# Patient Record
Sex: FEMALE | Race: WHITE | NOT HISPANIC OR LATINO | Employment: OTHER | URBAN - METROPOLITAN AREA
[De-identification: names, ages, dates, MRNs, and addresses within clinical notes are randomized per-mention and may not be internally consistent; named-entity substitution may affect disease eponyms.]

---

## 2018-04-19 ENCOUNTER — APPOINTMENT (OUTPATIENT)
Dept: LAB | Facility: HOSPITAL | Age: 63
End: 2018-04-19
Attending: ORTHOPAEDIC SURGERY
Payer: COMMERCIAL

## 2018-04-19 ENCOUNTER — TRANSCRIBE ORDERS (OUTPATIENT)
Dept: PREADMISSION TESTING | Facility: HOSPITAL | Age: 63
End: 2018-04-19

## 2018-04-19 ENCOUNTER — OFFICE VISIT (OUTPATIENT)
Dept: PREADMISSION TESTING | Facility: HOSPITAL | Age: 63
End: 2018-04-19
Attending: ORTHOPAEDIC SURGERY
Payer: COMMERCIAL

## 2018-04-19 VITALS
HEIGHT: 66 IN | DIASTOLIC BLOOD PRESSURE: 76 MMHG | TEMPERATURE: 98.1 F | RESPIRATION RATE: 16 BRPM | WEIGHT: 233.1 LBS | BODY MASS INDEX: 37.46 KG/M2 | SYSTOLIC BLOOD PRESSURE: 162 MMHG | HEART RATE: 69 BPM

## 2018-04-19 DIAGNOSIS — M16.12 PRIMARY OSTEOARTHRITIS OF LEFT HIP: Primary | ICD-10-CM

## 2018-04-19 DIAGNOSIS — I25.10 CORONARY ARTERY DISEASE INVOLVING NATIVE HEART WITHOUT ANGINA PECTORIS, UNSPECIFIED VESSEL OR LESION TYPE: ICD-10-CM

## 2018-04-19 DIAGNOSIS — M16.12 PRIMARY OSTEOARTHRITIS OF LEFT HIP: ICD-10-CM

## 2018-04-19 DIAGNOSIS — I10 ESSENTIAL HYPERTENSION: ICD-10-CM

## 2018-04-19 DIAGNOSIS — Z01.818 PREOPERATIVE EXAMINATION: ICD-10-CM

## 2018-04-19 DIAGNOSIS — E78.5 DYSLIPIDEMIA: ICD-10-CM

## 2018-04-19 DIAGNOSIS — G47.33 OSA (OBSTRUCTIVE SLEEP APNEA): ICD-10-CM

## 2018-04-19 PROBLEM — I31.9 PERICARDITIS: Status: ACTIVE | Noted: 2018-04-19

## 2018-04-19 LAB
ANION GAP SERPL CALC-SCNC: 10 MEQ/L (ref 3–15)
BUN SERPL-MCNC: 17 MG/DL (ref 8–20)
CALCIUM SERPL-MCNC: 9.8 MG/DL (ref 8.9–10.3)
CHLORIDE SERPL-SCNC: 100 MMOL/L (ref 98–109)
CO2 SERPL-SCNC: 27 MMOL/L (ref 22–32)
CREAT SERPL-MCNC: 0.7 MG/DL (ref 0.6–1.1)
ERYTHROCYTE [DISTWIDTH] IN BLOOD BY AUTOMATED COUNT: 13.3 % (ref 11.7–14.4)
ERYTHROCYTE [SEDIMENTATION RATE] IN BLOOD BY WESTERGREN METHOD: 33 MM/HR
GFR SERPL CREATININE-BSD FRML MDRD: >60 ML/MIN/1.73M*2
GLUCOSE SERPL-MCNC: 89 MG/DL (ref 70–99)
HCT VFR BLDCO AUTO: 35.3 % (ref 35–45)
HGB BLD-MCNC: 12 G/DL (ref 11.8–15.7)
MCH RBC QN AUTO: 29.4 PG (ref 28–33.2)
MCHC RBC AUTO-ENTMCNC: 34 G/DL (ref 32.2–35.5)
MCV RBC AUTO: 86.5 FL (ref 83–98)
PDW BLD AUTO: 10.5 FL (ref 9.4–12.3)
PLATELET # BLD AUTO: 227 K/UL (ref 150–369)
POTASSIUM SERPL-SCNC: 3.6 MMOL/L (ref 3.6–5.1)
RBC # BLD AUTO: 4.08 M/UL (ref 3.93–5.22)
SODIUM SERPL-SCNC: 137 MMOL/L (ref 136–144)
WBC # BLD AUTO: 8.26 K/UL (ref 3.8–10.5)

## 2018-04-19 PROCEDURE — 36415 COLL VENOUS BLD VENIPUNCTURE: CPT | Performed by: HOSPITALIST

## 2018-04-19 PROCEDURE — 80048 BASIC METABOLIC PNL TOTAL CA: CPT | Performed by: HOSPITALIST

## 2018-04-19 PROCEDURE — 87081 CULTURE SCREEN ONLY: CPT | Performed by: HOSPITALIST

## 2018-04-19 PROCEDURE — 99204 OFFICE O/P NEW MOD 45 MIN: CPT | Performed by: HOSPITALIST

## 2018-04-19 PROCEDURE — 85027 COMPLETE CBC AUTOMATED: CPT | Performed by: HOSPITALIST

## 2018-04-19 PROCEDURE — 85652 RBC SED RATE AUTOMATED: CPT | Performed by: HOSPITALIST

## 2018-04-19 RX ORDER — AMLODIPINE BESYLATE 5 MG/1
5 TABLET ORAL EVERY MORNING
COMMUNITY
Start: 2013-10-21 | End: 2019-09-10 | Stop reason: ALTCHOICE

## 2018-04-19 RX ORDER — ASPIRIN 81 MG/1
81 TABLET ORAL
Status: ON HOLD | COMMUNITY
Start: 2013-10-21 | End: 2018-05-02

## 2018-04-19 RX ORDER — SERTRALINE HYDROCHLORIDE 50 MG/1
50 TABLET, FILM COATED ORAL EVERY MORNING
COMMUNITY
Start: 2011-08-10 | End: 2025-02-11 | Stop reason: SDUPTHER

## 2018-04-19 RX ORDER — CLONIDINE 0.1 MG/24H
PATCH, EXTENDED RELEASE TRANSDERMAL WEEKLY
COMMUNITY
Start: 2011-08-10 | End: 2019-09-10 | Stop reason: ALTCHOICE

## 2018-04-19 RX ORDER — FERROUS SULFATE 325(65) MG
65 TABLET ORAL
COMMUNITY
End: 2018-05-02 | Stop reason: HOSPADM

## 2018-04-19 RX ORDER — LOSARTAN POTASSIUM 50 MG/1
50 TABLET ORAL NIGHTLY
COMMUNITY
Start: 2013-10-21

## 2018-04-19 RX ORDER — ATORVASTATIN CALCIUM 40 MG/1
40 TABLET, FILM COATED ORAL EVERY EVENING
COMMUNITY

## 2018-04-19 RX ORDER — CHLORTHALIDONE 25 MG/1
25 TABLET ORAL EVERY MORNING
COMMUNITY
Start: 2013-10-21 | End: 2019-09-10 | Stop reason: ALTCHOICE

## 2018-04-19 RX ORDER — ALBUTEROL SULFATE 0.83 MG/ML
2.5 SOLUTION RESPIRATORY (INHALATION) EVERY 6 HOURS PRN
COMMUNITY
End: 2019-09-10 | Stop reason: ALTCHOICE

## 2018-04-19 ASSESSMENT — PAIN SCALES - GENERAL: PAINLEVEL: 0-NO PAIN

## 2018-04-19 NOTE — CONSULTS
[facility] Medicine Service -  Pre-Operative Consultation       Patient Name: Barbara Landeros  Referring Surgeon: Dr Mckee     Reason for Referral: Pre-Operative Evaluation  Surgical Procedure: L CON  Operative Date: 18  Other Providers:      PCP: Tenzin Luevano SR, DO        HISTORY OF PRESENT ILLNESS      Barbara Landeros is a 62 y.o. female presenting today to the [facility] at [facility] [place] [facility] for pre-operative evaluation prior to planned surgery.    History of asthma - wheezing only with upper respiratory infection in the past. Had URI in Feb treated with short course of antibiotics and inhalers - all symptoms resolved and feels well at present.     The patient denies any current or recent chest pain or pressure, dyspnea, cough, sputum, fevers, chills, abdominal pain, nausea, vomiting, diarrhea or other symptoms.     Functionally, the patient is able to ascend a flight or so of stairs with no dyspnea or chest pain.     The patient denies, on specific questioning, the following:  No history of MI, arrhythmia,or CHF.  No history of CHUY + snoring, denies gasping/apnea, cardiologist discussed sleep study but she hasnt set it up yet  No history of DVT/PE.  No history of COPD.  No history of CVA.  No history of DM.   No history of CKD.     PAST MEDICAL AND SURGICAL HISTORY      Past Medical History:   Diagnosis Date   • Anxiety    • Arthritis    • Asthma    • H. pylori infection     treated with prev pac    • Heart palpitations    • Hypertension    • Lipid disorder    • Pericarditis     hospitalized 3-4 days, followed by Dr Mathur       Past Surgical History:   Procedure Laterality Date   •  SECTION      ,    • COLONOSCOPY     • ESOPHAGOGASTRODUODENOSCOPY     • JOINT REPLACEMENT Right     hip replacement   • LUNG SURGERY Left     pleural fluid   • SKIN BIOPSY      nose, BCC   • TONSILLECTOMY     • WISDOM TOOTH EXTRACTION         MEDICATIONS  "       Current Outpatient Prescriptions:   •  albuterol 2.5 mg /3 mL (0.083 %) nebulizer solution, Take 2.5 mg by nebulization every 6 (six) hours as needed for wheezing., Disp: , Rfl:   •  amLODIPine (NORVASC) 5 mg tablet, 5 mg every morning., Disp: , Rfl:   •  aspirin (ASPIR-81) 81 mg enteric coated tablet, 81 mg., Disp: , Rfl:   •  atorvastatin (LIPITOR) 20 mg tablet, Take 20 mg by mouth every evening., Disp: , Rfl:   •  chlorthalidone (HYGROTEN) 25 mg tablet, 25 mg every morning., Disp: , Rfl:   •  cloNIDine (CATAPRES-TTS) 0.1 mg/24 hr, once a week. mondays, Disp: , Rfl:   •  ferrous sulfate 325 mg (65 mg iron) tablet, Take 65 mg by mouth daily with breakfast., Disp: , Rfl:   •  losartan (COZAAR) 50 mg tablet, 50 mg every morning., Disp: , Rfl:   •  multivitamin tablet, Take by mouth daily., Disp: , Rfl:   •  sertraline (ZOLOFT) 50 mg tablet, 50 mg every morning., Disp: , Rfl:     ALLERGIES      Prevpac [[id number]]    FAMILY HISTORY      family history includes Heart disease in her father and mother; Hyperlipidemia in her father; Hypertension in her mother and sister.    Denies any prior known family history of DVTs/PEs/clotting disorder    SOCIAL HISTORY      Social History   Substance Use Topics   • Smoking status: Former Smoker     Quit date: 1997   • Smokeless tobacco: Never Used   • Alcohol use Yes      Comment: occas       REVIEW OF SYSTEMS      All other systems reviewed and negative except as noted in HPI    PHYSICAL EXAMINATION      BP (!) 162/76 (BP Location: Right upper arm, Patient Position: Sitting)   Pulse 69   Temp 36.7 °C (98.1 °F) (Temporal)   Resp 16   Ht 1.676 m (5' 6\")   Wt 106 kg (233 lb 1.6 oz)   BMI 37.62 kg/m²   Body mass index is 37.62 kg/m².    Physical Exam   Constitutional: She is oriented to person, place, and time. She appears well-developed and well-nourished.   HENT:   Head: Normocephalic and atraumatic.   Mouth/Throat: Oropharynx is clear and moist. "   Eyes: Conjunctivae and EOM are normal.   Neck: Normal range of motion. Neck supple.   Cardiovascular: Normal rate, regular rhythm and normal heart sounds.    Pulmonary/Chest: Effort normal and breath sounds normal.   Abdominal: Soft. Bowel sounds are normal.   Neurological: She is alert and oriented to person, place, and time.   Skin: Skin is warm and dry.   Psychiatric: She has a normal mood and affect.       LABS / EKG        Labs  Lab Results   Component Value Date    WBC 8.26 04/19/2018    HGB 12.0 04/19/2018    HCT 35.3 04/19/2018    MCV 86.5 04/19/2018     04/19/2018     Lab Results   Component Value Date    GLUCOSE 89 04/19/2018    CALCIUM 9.8 04/19/2018     04/19/2018    K 3.6 04/19/2018    CO2 27 04/19/2018     04/19/2018    BUN 17 04/19/2018    CREATININE 0.7 04/19/2018     No results found for: HGBA1C      ECG/Telemetry  EKG normal sinus rhythm     ASSESSMENT AND PLAN         Essential hypertension  Continue norvasc, clonidine   Hold chlorthalidone, losartan morning of surgery    Dyslipidemia  Continue statin     CAD (coronary artery disease)  No obstructive CAD on CTA in the past  Echo done last week with normal LV function per cardiology   No further preoperative testing recommended    Pericarditis  Echo done last week with cardiology - no effusion noted    CHUY (obstructive sleep apnea)  Increased risk of pulmonary complications if she does have untreated CHUY, can be further optimized with CPAP  Plans to pursue further sleep evaluation - defer further recommendations to pulmonary   Risk can be further minimized by extubation to BIPAP post operatively  If she is prescribed CPAP - will bring own machine to hospital   Monitor on CHUY protocol post op, minimize use of narcotics/sedating medications      Asthma - mild, well controlled     In regards to perioperative cardiac risk:  The patient denies any history of ischemic heart disease, denies any history of CHF, denies any history of  CVA, is not on pre-operative treatment with insulin, and does not have a pre-operative creatinine > 2 mg/dL.   The Revised Cardiac Risk Index (RCRI) for this patient indicates 0.4% risk.     Seen by cardiology, no further preop testing recommended    Further comments:  Resume supplements when OK with surgical team.  I would encourage incentive spirometry to assist with minimizing peace-operative pulmonary risk.  DVT prophylaxis and timing of such per the discretion of the surgeon.     Please do not hesitate to contact [facility] during the upcoming hospitalization with any questions or concerns.     Estela Raman MD  4/19/2018

## 2018-04-19 NOTE — ASSESSMENT & PLAN NOTE
No obstructive CAD on CTA in the past  Echo done last week with normal LV function per cardiology   No further preoperative testing recommended

## 2018-04-19 NOTE — PRE-PROCEDURE INSTRUCTIONS
1. We will call you between 3 pm and 7 pm on April 30, 2018 to determine that arrival time for your procedure. If you do not hear by 6PM. Please call [phone] for arrival time.    2. Please report to [facility] in [place] / jennie, walk into [facility] and report to the admission desk on first floor on the day of your procedure.   3. Please follow the following fasting guidelines:   Nothing to eat or drink after midnight unless otherwise instructed by  your physician.    4. Early on the morning of the procedure please take your usual dose of the listed medications with a sip of water:    Amlodipine  Chlorthalidone  Clonidine  zoloft     5. Other Instructions: bring list of medications, surgical shower, follow all instructions per md   6. If you develop a cold, cough, fever, rash, or other symptom prior to the data of the procedure, please report it to your physician immediately.   7. If you need to cancel the procedure for any reason, please contact your physician or call the unit listed above.   8. Make arrangements to have someone drive you home from the procedure. If you have not arranged for transportation home, your surgery may be cancelled.    9. You may not take public transportation unless accompanied by a responsible person.   10. You may not drive a car or operate complex or potentially dangerous machinery for 24 hours following anesthesia and/or sedation.   11. If it is medically necessary for you to have a longer stay, you will be informed as soon as the decision is made.   12. Do not wear or being anything of value to the hospital including jewelry of any kind. Do not wear make-up or contact lenses. DO bring your glasses and hearing aid.   13. No lotion, creams, powders, or oils on skin the morning of procedure    14. Dress in comfortable clothes.   15.  If instructed, please bring a copy of your Advanced Directive (Living Will/Durable Power of ) on the day of your procedure.      Pre operative  instructions given as per protocol.  Form explained by: Lesvia Galloway RN     I have read and understand the above information. I have had sufficient opportunity to ask questions I might have and they have been answered to my satisfaction. I agree to comply with the Patient Responsibilities listed above and have received a copy of this form.

## 2018-04-19 NOTE — ASSESSMENT & PLAN NOTE
Increased risk of pulmonary complications if she does have untreated CHUY, can be further optimized with CPAP  Plans to pursue further sleep evaluation - defer further recommendations to pulmonary   Risk can be further minimized by extubation to BIPAP post operatively  If she is prescribed CPAP - will bring own machine to hospital   Monitor on CHUY protocol post op, minimize use of narcotics/sedating medications

## 2018-04-21 LAB — MICROORGANISM SPEC CULT: NORMAL

## 2018-04-30 ENCOUNTER — ANESTHESIA EVENT (OUTPATIENT)
Dept: OPERATING ROOM | Facility: HOSPITAL | Age: 63
Setting detail: SURGERY ADMIT
DRG: 470 | End: 2018-04-30
Payer: COMMERCIAL

## 2018-05-01 ENCOUNTER — APPOINTMENT (INPATIENT)
Dept: PHYSICAL THERAPY | Facility: HOSPITAL | Age: 63
DRG: 470 | End: 2018-05-01
Attending: PHYSICIAN ASSISTANT
Payer: COMMERCIAL

## 2018-05-01 ENCOUNTER — ANESTHESIA (OUTPATIENT)
Dept: OPERATING ROOM | Facility: HOSPITAL | Age: 63
Setting detail: SURGERY ADMIT
DRG: 470 | End: 2018-05-01
Payer: COMMERCIAL

## 2018-05-01 ENCOUNTER — APPOINTMENT (INPATIENT)
Dept: RADIOLOGY | Facility: HOSPITAL | Age: 63
DRG: 470 | End: 2018-05-01
Attending: PHYSICIAN ASSISTANT
Payer: COMMERCIAL

## 2018-05-01 ENCOUNTER — HOSPITAL ENCOUNTER (INPATIENT)
Facility: HOSPITAL | Age: 63
LOS: 1 days | Discharge: HOME | DRG: 470 | End: 2018-05-02
Attending: ORTHOPAEDIC SURGERY | Admitting: ORTHOPAEDIC SURGERY
Payer: COMMERCIAL

## 2018-05-01 DIAGNOSIS — M16.12 PRIMARY OSTEOARTHRITIS OF LEFT HIP: ICD-10-CM

## 2018-05-01 PROCEDURE — C1776 JOINT DEVICE (IMPLANTABLE): HCPCS | Performed by: ORTHOPAEDIC SURGERY

## 2018-05-01 PROCEDURE — 63600000 HC DRUGS/DETAIL CODE: Performed by: PHYSICIAN ASSISTANT

## 2018-05-01 PROCEDURE — 3E0R3BZ INTRODUCTION OF ANESTHETIC AGENT INTO SPINAL CANAL, PERCUTANEOUS APPROACH: ICD-10-PCS | Performed by: ANESTHESIOLOGY

## 2018-05-01 PROCEDURE — 25800000 HC PHARMACY IV SOLUTIONS: Performed by: PHYSICIAN ASSISTANT

## 2018-05-01 PROCEDURE — 37000010 HC ANESTHESIA SPINAL: Performed by: ORTHOPAEDIC SURGERY

## 2018-05-01 PROCEDURE — 25000000 HC PHARMACY GENERAL: Performed by: ORTHOPAEDIC SURGERY

## 2018-05-01 PROCEDURE — 63600000 HC DRUGS/DETAIL CODE: Performed by: NURSE ANESTHETIST, CERTIFIED REGISTERED

## 2018-05-01 PROCEDURE — 63700000 HC SELF-ADMINISTRABLE DRUG: Performed by: PHYSICIAN ASSISTANT

## 2018-05-01 PROCEDURE — 36000005 HC OR LEVEL 5 INITIAL 30MIN: Performed by: ORTHOPAEDIC SURGERY

## 2018-05-01 PROCEDURE — 25000000 HC PHARMACY GENERAL: Performed by: PHYSICIAN ASSISTANT

## 2018-05-01 PROCEDURE — 25800000 HC PHARMACY IV SOLUTIONS: Performed by: ORTHOPAEDIC SURGERY

## 2018-05-01 PROCEDURE — 12000000 HC ROOM AND CARE MED/SURG

## 2018-05-01 PROCEDURE — 71000011 HC PACU PHASE 1 EA ADDL MIN: Performed by: ORTHOPAEDIC SURGERY

## 2018-05-01 PROCEDURE — 73501 X-RAY EXAM HIP UNI 1 VIEW: CPT | Mod: LT

## 2018-05-01 PROCEDURE — 97161 PT EVAL LOW COMPLEX 20 MIN: CPT | Mod: GP

## 2018-05-01 PROCEDURE — 37000010 ANESTHESIA SPINAL BLOCK: Performed by: ANESTHESIOLOGY

## 2018-05-01 PROCEDURE — 25000000 HC PHARMACY GENERAL: Performed by: NURSE ANESTHETIST, CERTIFIED REGISTERED

## 2018-05-01 PROCEDURE — 0SRB04A REPLACEMENT OF LEFT HIP JOINT WITH CERAMIC ON POLYETHYLENE SYNTHETIC SUBSTITUTE, UNCEMENTED, OPEN APPROACH: ICD-10-PCS | Performed by: ORTHOPAEDIC SURGERY

## 2018-05-01 PROCEDURE — 71000001 HC PACU PHASE 1 INITIAL 30MIN: Performed by: ORTHOPAEDIC SURGERY

## 2018-05-01 PROCEDURE — 36000015 HC OR LEVEL 5 EA ADDL MIN: Performed by: ORTHOPAEDIC SURGERY

## 2018-05-01 PROCEDURE — 27200000 HC STERILE SUPPLY: Performed by: ORTHOPAEDIC SURGERY

## 2018-05-01 DEVICE — HEAD CERAMIC FEMORAL: Type: IMPLANTABLE DEVICE | Site: HIP | Status: FUNCTIONAL

## 2018-05-01 DEVICE — IMPLANTABLE DEVICE: Type: IMPLANTABLE DEVICE | Site: HIP | Status: FUNCTIONAL

## 2018-05-01 DEVICE — BONE SCREW 6.5X30 SELF-TAPPING: Type: IMPLANTABLE DEVICE | Site: HIP | Status: FUNCTIONAL

## 2018-05-01 DEVICE — STEM FEM TRAB METAL SIZE 12 128MM: Type: IMPLANTABLE DEVICE | Site: HIP | Status: FUNCTIONAL

## 2018-05-01 RX ORDER — SODIUM CHLORIDE 9 MG/ML
INJECTION, SOLUTION INTRAVENOUS CONTINUOUS
Status: DISCONTINUED | OUTPATIENT
Start: 2018-05-01 | End: 2018-05-02 | Stop reason: HOSPADM

## 2018-05-01 RX ORDER — POTASSIUM CHLORIDE 750 MG/1
20 TABLET, FILM COATED, EXTENDED RELEASE ORAL DAILY
Status: DISCONTINUED | OUTPATIENT
Start: 2018-05-01 | End: 2018-05-02 | Stop reason: HOSPADM

## 2018-05-01 RX ORDER — LOSARTAN POTASSIUM 50 MG/1
50 TABLET ORAL EVERY MORNING
Status: DISCONTINUED | OUTPATIENT
Start: 2018-05-02 | End: 2018-05-02 | Stop reason: HOSPADM

## 2018-05-01 RX ORDER — ACETAMINOPHEN 325 MG/1
975 TABLET ORAL ONCE
Status: COMPLETED | OUTPATIENT
Start: 2018-05-01 | End: 2018-05-01

## 2018-05-01 RX ORDER — CEFAZOLIN SODIUM/WATER 1 G/10 ML
2 SYRINGE (ML) INTRAVENOUS
Status: COMPLETED | OUTPATIENT
Start: 2018-05-01 | End: 2018-05-02

## 2018-05-01 RX ORDER — KETOROLAC TROMETHAMINE 30 MG/ML
7.5 INJECTION, SOLUTION INTRAMUSCULAR; INTRAVENOUS EVERY 6 HOURS
Status: DISCONTINUED | OUTPATIENT
Start: 2018-05-01 | End: 2018-05-02 | Stop reason: HOSPADM

## 2018-05-01 RX ORDER — BUPIVACAINE HCL/EPINEPHRINE 0.5-1:200K
VIAL (ML) INJECTION AS NEEDED
Status: DISCONTINUED | OUTPATIENT
Start: 2018-05-01 | End: 2018-05-01 | Stop reason: HOSPADM

## 2018-05-01 RX ORDER — SENNOSIDES 8.6 MG/1
1 TABLET ORAL 2 TIMES DAILY PRN
Status: DISCONTINUED | OUTPATIENT
Start: 2018-05-01 | End: 2018-05-02 | Stop reason: HOSPADM

## 2018-05-01 RX ORDER — DIPHENHYDRAMINE HYDROCHLORIDE 50 MG/ML
25 INJECTION INTRAMUSCULAR; INTRAVENOUS EVERY 6 HOURS PRN
Status: DISCONTINUED | OUTPATIENT
Start: 2018-05-01 | End: 2018-05-02 | Stop reason: HOSPADM

## 2018-05-01 RX ORDER — PHENYLEPHRINE HYDROCHLORIDE 10 MG/ML
INJECTION INTRAVENOUS AS NEEDED
Status: DISCONTINUED | OUTPATIENT
Start: 2018-05-01 | End: 2018-05-01 | Stop reason: SURG

## 2018-05-01 RX ORDER — TRAMADOL HYDROCHLORIDE 50 MG/1
50-100 TABLET ORAL EVERY 6 HOURS PRN
Status: DISCONTINUED | OUTPATIENT
Start: 2018-05-01 | End: 2018-05-02 | Stop reason: HOSPADM

## 2018-05-01 RX ORDER — ONDANSETRON HYDROCHLORIDE 2 MG/ML
4 INJECTION, SOLUTION INTRAVENOUS EVERY 8 HOURS PRN
Status: DISCONTINUED | OUTPATIENT
Start: 2018-05-01 | End: 2018-05-02 | Stop reason: HOSPADM

## 2018-05-01 RX ORDER — CEFAZOLIN SODIUM/WATER 1 G/10 ML
2 SYRINGE (ML) INTRAVENOUS
Status: COMPLETED | OUTPATIENT
Start: 2018-05-01 | End: 2018-05-01

## 2018-05-01 RX ORDER — HYDROMORPHONE HYDROCHLORIDE 2 MG/ML
.5-1 INJECTION, SOLUTION INTRAMUSCULAR; INTRAVENOUS; SUBCUTANEOUS
Status: DISCONTINUED | OUTPATIENT
Start: 2018-05-01 | End: 2018-05-02 | Stop reason: HOSPADM

## 2018-05-01 RX ORDER — AMLODIPINE BESYLATE 5 MG/1
5 TABLET ORAL EVERY MORNING
Status: DISCONTINUED | OUTPATIENT
Start: 2018-05-02 | End: 2018-05-02 | Stop reason: HOSPADM

## 2018-05-01 RX ORDER — FAMOTIDINE 10 MG/ML
INJECTION INTRAVENOUS AS NEEDED
Status: DISCONTINUED | OUTPATIENT
Start: 2018-05-01 | End: 2018-05-01 | Stop reason: SURG

## 2018-05-01 RX ORDER — CHLORTHALIDONE 25 MG/1
25 TABLET ORAL EVERY MORNING
Status: DISCONTINUED | OUTPATIENT
Start: 2018-05-02 | End: 2018-05-02 | Stop reason: HOSPADM

## 2018-05-01 RX ORDER — POLYETHYLENE GLYCOL 3350 17 G/17G
17 POWDER, FOR SOLUTION ORAL DAILY
Status: DISCONTINUED | OUTPATIENT
Start: 2018-05-01 | End: 2018-05-02 | Stop reason: HOSPADM

## 2018-05-01 RX ORDER — AMOXICILLIN 250 MG
1 CAPSULE ORAL 2 TIMES DAILY
Status: DISCONTINUED | OUTPATIENT
Start: 2018-05-01 | End: 2018-05-02 | Stop reason: HOSPADM

## 2018-05-01 RX ORDER — PROPOFOL 10 MG/ML
INJECTION, EMULSION INTRAVENOUS AS NEEDED
Status: DISCONTINUED | OUTPATIENT
Start: 2018-05-01 | End: 2018-05-01 | Stop reason: SURG

## 2018-05-01 RX ORDER — POLYETHYLENE GLYCOL 3350 17 G/17G
17 POWDER, FOR SOLUTION ORAL DAILY PRN
Status: DISCONTINUED | OUTPATIENT
Start: 2018-05-01 | End: 2018-05-02 | Stop reason: HOSPADM

## 2018-05-01 RX ORDER — ACETAMINOPHEN 325 MG/1
650 TABLET ORAL
Status: DISCONTINUED | OUTPATIENT
Start: 2018-05-01 | End: 2018-05-02 | Stop reason: HOSPADM

## 2018-05-01 RX ORDER — ONDANSETRON HYDROCHLORIDE 2 MG/ML
INJECTION, SOLUTION INTRAVENOUS AS NEEDED
Status: DISCONTINUED | OUTPATIENT
Start: 2018-05-01 | End: 2018-05-01 | Stop reason: SURG

## 2018-05-01 RX ORDER — ONDANSETRON 4 MG/1
4 TABLET, ORALLY DISINTEGRATING ORAL EVERY 8 HOURS PRN
Status: DISCONTINUED | OUTPATIENT
Start: 2018-05-01 | End: 2018-05-02 | Stop reason: HOSPADM

## 2018-05-01 RX ORDER — ALBUTEROL SULFATE 0.83 MG/ML
2.5 SOLUTION RESPIRATORY (INHALATION) EVERY 6 HOURS PRN
Status: DISCONTINUED | OUTPATIENT
Start: 2018-05-01 | End: 2018-05-02 | Stop reason: HOSPADM

## 2018-05-01 RX ORDER — METOCLOPRAMIDE HYDROCHLORIDE 5 MG/ML
INJECTION INTRAMUSCULAR; INTRAVENOUS AS NEEDED
Status: DISCONTINUED | OUTPATIENT
Start: 2018-05-01 | End: 2018-05-01 | Stop reason: SURG

## 2018-05-01 RX ORDER — DEXAMETHASONE SODIUM PHOSPHATE 4 MG/ML
6 INJECTION, SOLUTION INTRA-ARTICULAR; INTRALESIONAL; INTRAMUSCULAR; INTRAVENOUS; SOFT TISSUE ONCE
Status: COMPLETED | OUTPATIENT
Start: 2018-05-02 | End: 2018-05-02

## 2018-05-01 RX ORDER — PROPOFOL 10 MG/ML
INJECTION, EMULSION INTRAVENOUS CONTINUOUS PRN
Status: DISCONTINUED | OUTPATIENT
Start: 2018-05-01 | End: 2018-05-01 | Stop reason: SURG

## 2018-05-01 RX ORDER — IBUPROFEN 200 MG
16-32 TABLET ORAL AS NEEDED
Status: DISCONTINUED | OUTPATIENT
Start: 2018-05-01 | End: 2018-05-02 | Stop reason: HOSPADM

## 2018-05-01 RX ORDER — ALUMINUM HYDROXIDE, MAGNESIUM HYDROXIDE, AND SIMETHICONE 1200; 120; 1200 MG/30ML; MG/30ML; MG/30ML
30 SUSPENSION ORAL EVERY 4 HOURS PRN
Status: DISCONTINUED | OUTPATIENT
Start: 2018-05-01 | End: 2018-05-02 | Stop reason: HOSPADM

## 2018-05-01 RX ORDER — DEXTROSE MONOHYDRATE, SODIUM CHLORIDE, AND POTASSIUM CHLORIDE 50; 1.49; 9 G/1000ML; G/1000ML; G/1000ML
125 INJECTION, SOLUTION INTRAVENOUS CONTINUOUS
Status: ACTIVE | OUTPATIENT
Start: 2018-05-01 | End: 2018-05-02

## 2018-05-01 RX ORDER — MIDAZOLAM HYDROCHLORIDE 2 MG/2ML
INJECTION, SOLUTION INTRAMUSCULAR; INTRAVENOUS AS NEEDED
Status: DISCONTINUED | OUTPATIENT
Start: 2018-05-01 | End: 2018-05-01 | Stop reason: SURG

## 2018-05-01 RX ORDER — ACETAMINOPHEN 650 MG/20.3ML
650 LIQUID ORAL EVERY 4 HOURS PRN
Status: DISCONTINUED | OUTPATIENT
Start: 2018-05-01 | End: 2018-05-02 | Stop reason: HOSPADM

## 2018-05-01 RX ORDER — ONDANSETRON 4 MG/1
4 TABLET, ORALLY DISINTEGRATING ORAL
Status: DISCONTINUED | OUTPATIENT
Start: 2018-05-01 | End: 2018-05-02 | Stop reason: HOSPADM

## 2018-05-01 RX ORDER — CLONIDINE 0.1 MG/24H
1 PATCH, EXTENDED RELEASE TRANSDERMAL WEEKLY
Status: DISCONTINUED | OUTPATIENT
Start: 2018-05-07 | End: 2018-05-02 | Stop reason: HOSPADM

## 2018-05-01 RX ORDER — LIDOCAINE HYDROCHLORIDE 10 MG/ML
INJECTION, SOLUTION INFILTRATION; PERINEURAL AS NEEDED
Status: DISCONTINUED | OUTPATIENT
Start: 2018-05-01 | End: 2018-05-01 | Stop reason: SURG

## 2018-05-01 RX ORDER — DEXAMETHASONE SODIUM PHOSPHATE 4 MG/ML
INJECTION, SOLUTION INTRA-ARTICULAR; INTRALESIONAL; INTRAMUSCULAR; INTRAVENOUS; SOFT TISSUE AS NEEDED
Status: DISCONTINUED | OUTPATIENT
Start: 2018-05-01 | End: 2018-05-01 | Stop reason: SURG

## 2018-05-01 RX ORDER — DEXTROSE 40 %
15-30 GEL (GRAM) ORAL AS NEEDED
Status: DISCONTINUED | OUTPATIENT
Start: 2018-05-01 | End: 2018-05-02 | Stop reason: HOSPADM

## 2018-05-01 RX ORDER — NAPROXEN SODIUM 220 MG/1
81 TABLET, FILM COATED ORAL 2 TIMES DAILY
Status: DISCONTINUED | OUTPATIENT
Start: 2018-05-01 | End: 2018-05-02 | Stop reason: HOSPADM

## 2018-05-01 RX ORDER — ATORVASTATIN CALCIUM 20 MG/1
20 TABLET, FILM COATED ORAL EVERY EVENING
Status: DISCONTINUED | OUTPATIENT
Start: 2018-05-01 | End: 2018-05-02 | Stop reason: HOSPADM

## 2018-05-01 RX ORDER — SERTRALINE HYDROCHLORIDE 50 MG/1
50 TABLET, FILM COATED ORAL EVERY MORNING
Status: DISCONTINUED | OUTPATIENT
Start: 2018-05-02 | End: 2018-05-02 | Stop reason: HOSPADM

## 2018-05-01 RX ORDER — DIPHENHYDRAMINE HCL 25 MG
25 CAPSULE ORAL EVERY 6 HOURS PRN
Status: DISCONTINUED | OUTPATIENT
Start: 2018-05-01 | End: 2018-05-02 | Stop reason: HOSPADM

## 2018-05-01 RX ORDER — BUPIVACAINE HYDROCHLORIDE 7.5 MG/ML
INJECTION, SOLUTION INTRASPINAL AS NEEDED
Status: DISCONTINUED | OUTPATIENT
Start: 2018-05-01 | End: 2018-05-01 | Stop reason: SURG

## 2018-05-01 RX ORDER — BISACODYL 10 MG/1
10 SUPPOSITORY RECTAL DAILY PRN
Status: DISCONTINUED | OUTPATIENT
Start: 2018-05-01 | End: 2018-05-02 | Stop reason: HOSPADM

## 2018-05-01 RX ORDER — DEXTROSE 50 % IN WATER (D50W) INTRAVENOUS SYRINGE
25 AS NEEDED
Status: DISCONTINUED | OUTPATIENT
Start: 2018-05-01 | End: 2018-05-02 | Stop reason: HOSPADM

## 2018-05-01 RX ORDER — ACETAMINOPHEN 650 MG/1
650 SUPPOSITORY RECTAL EVERY 4 HOURS PRN
Status: DISCONTINUED | OUTPATIENT
Start: 2018-05-01 | End: 2018-05-02 | Stop reason: HOSPADM

## 2018-05-01 RX ORDER — KETOROLAC TROMETHAMINE 30 MG/ML
INJECTION, SOLUTION INTRAMUSCULAR; INTRAVENOUS AS NEEDED
Status: DISCONTINUED | OUTPATIENT
Start: 2018-05-01 | End: 2018-05-01 | Stop reason: SURG

## 2018-05-01 RX ORDER — EPHEDRINE SULFATE 50 MG/ML
INJECTION, SOLUTION INTRAVENOUS AS NEEDED
Status: DISCONTINUED | OUTPATIENT
Start: 2018-05-01 | End: 2018-05-01 | Stop reason: SURG

## 2018-05-01 RX ORDER — PANTOPRAZOLE SODIUM 40 MG/1
40 TABLET, DELAYED RELEASE ORAL
Status: DISCONTINUED | OUTPATIENT
Start: 2018-05-02 | End: 2018-05-02 | Stop reason: HOSPADM

## 2018-05-01 RX ORDER — ACETAMINOPHEN 325 MG/1
650 TABLET ORAL EVERY 4 HOURS PRN
Status: DISCONTINUED | OUTPATIENT
Start: 2018-05-01 | End: 2018-05-02 | Stop reason: HOSPADM

## 2018-05-01 RX ORDER — OXYCODONE HYDROCHLORIDE 5 MG/1
5-10 TABLET ORAL EVERY 4 HOURS PRN
Status: DISCONTINUED | OUTPATIENT
Start: 2018-05-01 | End: 2018-05-02 | Stop reason: HOSPADM

## 2018-05-01 RX ADMIN — ACETAMINOPHEN 975 MG: 325 TABLET, FILM COATED ORAL at 07:48

## 2018-05-01 RX ADMIN — PROPOFOL 20 MG: 10 INJECTION, EMULSION INTRAVENOUS at 09:43

## 2018-05-01 RX ADMIN — PROPOFOL 75 MCG/KG/MIN: 10 INJECTION, EMULSION INTRAVENOUS at 09:43

## 2018-05-01 RX ADMIN — TRANEXAMIC ACID 2100 MG: 100 INJECTION, SOLUTION INTRAVENOUS at 09:39

## 2018-05-01 RX ADMIN — EPHEDRINE SULFATE 5 MG: 50 INJECTION INTRAVENOUS at 09:59

## 2018-05-01 RX ADMIN — SODIUM CHLORIDE: 9 INJECTION, SOLUTION INTRAVENOUS at 08:03

## 2018-05-01 RX ADMIN — FAMOTIDINE 20 MG: 10 INJECTION, SOLUTION INTRAVENOUS at 09:26

## 2018-05-01 RX ADMIN — ACETAMINOPHEN 650 MG: 325 TABLET, FILM COATED ORAL at 14:22

## 2018-05-01 RX ADMIN — Medication 2 G: at 17:10

## 2018-05-01 RX ADMIN — PHENYLEPHRINE HYDROCHLORIDE 100 MCG: 10 INJECTION INTRAVENOUS at 09:59

## 2018-05-01 RX ADMIN — PHENYLEPHRINE HYDROCHLORIDE 100 MCG: 10 INJECTION INTRAVENOUS at 10:11

## 2018-05-01 RX ADMIN — POTASSIUM CHLORIDE, DEXTROSE MONOHYDRATE AND SODIUM CHLORIDE 125 ML/HR: 150; 5; 900 INJECTION, SOLUTION INTRAVENOUS at 13:26

## 2018-05-01 RX ADMIN — ASPIRIN 81 MG: 81 TABLET, CHEWABLE ORAL at 20:43

## 2018-05-01 RX ADMIN — MULTIPLE VITAMINS W/ MINERALS TAB 1 TABLET: TAB at 18:38

## 2018-05-01 RX ADMIN — SODIUM CHLORIDE: 9 INJECTION, SOLUTION INTRAVENOUS at 09:22

## 2018-05-01 RX ADMIN — DEXAMETHASONE SODIUM PHOSPHATE 6 MG: 4 INJECTION, SOLUTION INTRAMUSCULAR; INTRAVENOUS at 09:30

## 2018-05-01 RX ADMIN — MIDAZOLAM HYDROCHLORIDE 1 MG: 1 INJECTION, SOLUTION INTRAMUSCULAR; INTRAVENOUS at 09:33

## 2018-05-01 RX ADMIN — POTASSIUM CHLORIDE, DEXTROSE MONOHYDRATE AND SODIUM CHLORIDE 125 ML/HR: 150; 5; 900 INJECTION, SOLUTION INTRAVENOUS at 21:01

## 2018-05-01 RX ADMIN — SENNOSIDES AND DOCUSATE SODIUM 1 TABLET: 8.6; 5 TABLET ORAL at 20:44

## 2018-05-01 RX ADMIN — TRAMADOL HYDROCHLORIDE 50 MG: 50 TABLET, COATED ORAL at 16:01

## 2018-05-01 RX ADMIN — KETOROLAC TROMETHAMINE 7.5 MG: 30 INJECTION, SOLUTION INTRAMUSCULAR at 10:51

## 2018-05-01 RX ADMIN — PROPOFOL 20 MG: 10 INJECTION, EMULSION INTRAVENOUS at 09:26

## 2018-05-01 RX ADMIN — KETOROLAC TROMETHAMINE 7.5 MG: 30 INJECTION, SOLUTION INTRAMUSCULAR at 18:38

## 2018-05-01 RX ADMIN — Medication 2 G: at 09:42

## 2018-05-01 RX ADMIN — LIDOCAINE HYDROCHLORIDE 2 ML: 10 INJECTION, SOLUTION INFILTRATION; PERINEURAL at 09:26

## 2018-05-01 RX ADMIN — MIDAZOLAM HYDROCHLORIDE 1 MG: 1 INJECTION, SOLUTION INTRAMUSCULAR; INTRAVENOUS at 09:27

## 2018-05-01 RX ADMIN — METOCLOPRAMIDE 10 MG: 5 INJECTION, SOLUTION INTRAMUSCULAR; INTRAVENOUS at 09:26

## 2018-05-01 RX ADMIN — BUPIVACAINE HYDROCHLORIDE IN DEXTROSE 2 ML: 7.5 INJECTION, SOLUTION SUBARACHNOID at 09:26

## 2018-05-01 RX ADMIN — ATORVASTATIN CALCIUM 20 MG: 20 TABLET, FILM COATED ORAL at 18:38

## 2018-05-01 RX ADMIN — POTASSIUM CHLORIDE 20 MEQ: 750 TABLET, FILM COATED, EXTENDED RELEASE ORAL at 16:01

## 2018-05-01 RX ADMIN — ONDANSETRON 4 MG: 2 INJECTION INTRAMUSCULAR; INTRAVENOUS at 10:51

## 2018-05-01 RX ADMIN — ACETAMINOPHEN 650 MG: 325 TABLET, FILM COATED ORAL at 20:43

## 2018-05-01 ASSESSMENT — COGNITIVE AND FUNCTIONAL STATUS - GENERAL
HELP NEEDED FOR PERSONAL GROOMING: 4 - NONE
CLIMB 3 TO 5 STEPS WITH RAILING: 4 - NONE
STANDING UP FROM CHAIR USING ARMS: 3 - A LITTLE
DRESSING REGULAR LOWER BODY CLOTHING: 4 - NONE
MOVING TO AND FROM BED TO CHAIR: 3 - A LITTLE
WALKING IN HOSPITAL ROOM: 4 - NONE
DRESSING REGULAR UPPER BODY CLOTHING: 4 - NONE
STANDING UP FROM CHAIR USING ARMS: 4 - NONE
WALKING IN HOSPITAL ROOM: 3 - A LITTLE
CLIMB 3 TO 5 STEPS WITH RAILING: 3 - A LITTLE
MOVING TO AND FROM BED TO CHAIR: 4 - NONE
HELP NEEDED FOR BATHING: 4 - NONE
EATING MEALS: 4 - NONE
TOILETING: 4 - NONE

## 2018-05-01 NOTE — BRIEF OP NOTE
Left Total Hip Arthroplasty (L) Procedure Note    Procedure:    Left Total Hip Arthroplasty  CPT(R) Code:  32490 - MD TOTAL HIP ARTHROPLASTY      * No Diagnosis Codes entered *       * No Diagnosis Codes entered *    Surgeon(s) and Role:     * SIMONA Teresa - Assisting     * Sandeep Mckee MD - Primary    Anesthesia: General    Staff:   Circulator: Brenda Saul RN; Nahed Bob RN  Physician Assistant: SIMONA Paredes  Scrub Person: Dallas Long    Procedure Details   Unremarkable OR  No Tourniquet    Estimated Blood Loss: 100 mL    Specimens:                No specimens collected during this procedure.      Drains:      Implants:   Implant Name Type Inv. Item Serial No.  Lot No. LRB No. Used   SHELL ACETABULAR 48MM OD SIZE GG - [id number]  SHELL ACETABULAR 48MM OD SIZE GG  [employer] [id number] Left 1   BONE SCREW 6.5X30 SELF-TAPPING - [id number] Acetabular screw BONE SCREW 6.5X30 SELF-TAPPING  [employer] [id number] Left 1   LINER ACETAB 7MM OFFSET 32MM ID SIZE GG - [id number]  LINER ACETAB 7MM OFFSET 32MM ID SIZE GG  [employer] [id number] Left 1   STEM FEM TRAB METAL SIZE 12 128MM - [id number]  STEM FEM TRAB METAL SIZE 12 128MM  [employer] [id number] Left 1   HEAD CERAMIC FEMORAL - [id number]   HEAD CERAMIC FEMORAL   [employer] [id number] Left 1              Complications:  None; patient tolerated the procedure well.           Disposition: PACU - hemodynamically stable.           Condition: stable    Sandeep Mckee MD  Phone Number: [phone]

## 2018-05-01 NOTE — ANESTHESIA POSTPROCEDURE EVALUATION
Patient: Barbara Landeros    Procedure Summary     Date:  05/01/18 Room / Location:  [facility] OR [facility] / [facility]    Anesthesia Start:  0922 Anesthesia Stop:  1113    Procedure:  Left Total Hip Arthroplasty (Left Hip) Diagnosis:  (Left Hip Pain)    Surgeon:  Sandeep Mckee MD Responsible Provider:  Patti Valencia MD    Anesthesia Type:  spinal ASA Status:  3          Anesthesia Type: spinal  PACU Vitals  5/1/2018 1106 - 5/1/2018 1206      5/1/2018 1110 5/1/2018 1115 5/1/2018 1125 5/1/2018 1140    BP: 116/61 - 114/62 119/64    Temp: - 36.6 °C (97.9 °F) - -    Pulse: - 66 64 64    Resp: - (!)  23 16 16    SpO2: - 100 % 100 % 100 %              5/1/2018 1155             BP: 117/67       Temp: -       Pulse: 68       Resp: (!)  30       SpO2: 100 %               Anesthesia Post Evaluation    Pain management: satisfactory to patient  Mode of pain management: IV medication  Patient location during evaluation: PACU  Patient participation: complete - patient participated  Level of consciousness: awake and alert  Cardiovascular status: acceptable  Airway Patency: adequate  Respiratory status: acceptable  Hydration status: stable  Anesthetic complications: no

## 2018-05-01 NOTE — ANESTHESIA PREPROCEDURE EVALUATION
Anesthesia ROS/MED HX    Anesthesia History    Previous anesthetics  Pulmonary    asthma   Sleep apnea  Neuro/Psych - neg  Cardiovascular   CAD   dyslipidemia   hypertension  GI/Hepatic- neg  Musculoskeletal   Arthritis  Endo/Other- neg   Body Habitus: Obese      Past Surgical History:   Procedure Laterality Date   •  SECTION      ,    • COLONOSCOPY     • ESOPHAGOGASTRODUODENOSCOPY     • JOINT REPLACEMENT Right     hip replacement   • LUNG SURGERY Left     pleural fluid   • SKIN BIOPSY      nose, BCC   • TONSILLECTOMY     • WISDOM TOOTH EXTRACTION         Physical Exam    Airway   Mallampati: II   TM distance: >3 FB   Neck ROM: full  Cardiovascular - normal   Rhythm: regular   Rate: normal  Pulmonary - normal   clear to auscultation  Dental - normal        Anesthesia Plan    Plan: spinal    Technique: spinal   ASA 3  Anesthetic plan and risks discussed with: patient and spouse       Lab Results   Component Value Date    WBC 8.26 2018    HGB 12.0 2018    HCT 35.3 2018    MCV 86.5 2018     2018       Lab Results   Component Value Date    GLUCOSE 89 2018    CALCIUM 9.8 2018     2018    K 3.6 2018    CO2 27 2018     2018    BUN 17 2018    CREATININE 0.7 2018       No results found for: HCGPREGUR, PREGSERUM, HCG, HCGQUANT          Current Facility-Administered Medications   Medication Dose Route Frequency Provider Last Rate Last Dose   • ceFAZolin in sterile water (ANCEF) injection 2 g  2 g intravenous 60 min Pre-Op SIMONA Boyd       • sodium chloride 0.9 % infusion   intravenous Continuous Sandeep Mckee MD 40 mL/hr at 18 0803     • tranexamic acid 2,100 mg in sodium chloride 0.9 % 50 mL IVPB  20 mg/kg intravenous Once SIMONA Boyd           Prior to Admission medications    Medication Sig Start Date End Date Taking? Authorizing Provider   amLODIPine (NORVASC) 5 mg  tablet 5 mg every morning. 10/21/13  Yes Historical Provider, MD   aspirin (ASPIR-81) 81 mg enteric coated tablet 81 mg. 10/21/13  Yes Historical Provider, MD   atorvastatin (LIPITOR) 20 mg tablet Take 20 mg by mouth every evening.   Yes Historical Provider, MD   chlorthalidone (HYGROTEN) 25 mg tablet 25 mg every morning. 10/21/13  Yes Historical Provider, MD   cloNIDine (CATAPRES-TTS) 0.1 mg/24 hr once a week. mondays 8/10/11  Yes Historical Provider, MD   ferrous sulfate 325 mg (65 mg iron) tablet Take 65 mg by mouth daily with breakfast.   Yes Historical Provider, MD   losartan (COZAAR) 50 mg tablet 50 mg every morning. 10/21/13  Yes Historical Provider, MD   multivitamin tablet Take by mouth daily.   Yes Historical Provider, MD   sertraline (ZOLOFT) 50 mg tablet 50 mg every morning. 8/10/11  Yes Historical Provider, MD   albuterol 2.5 mg /3 mL (0.083 %) nebulizer solution Take 2.5 mg by nebulization every 6 (six) hours as needed for wheezing.    Historical Provider, MD       Patient Active Problem List   Diagnosis   • Essential hypertension   • Dyslipidemia   • CAD (coronary artery disease)   • Pericarditis   • CHUY (obstructive sleep apnea)       Past Surgical History:   Procedure Laterality Date   •  SECTION      ,    • COLONOSCOPY     • ESOPHAGOGASTRODUODENOSCOPY     • JOINT REPLACEMENT Right     hip replacement   • LUNG SURGERY Left     pleural fluid   • SKIN BIOPSY      nose, BCC   • TONSILLECTOMY     • WISDOM TOOTH EXTRACTION

## 2018-05-01 NOTE — PLAN OF CARE
Problem: Patient Care Overview  Goal: Plan of Care Review  Outcome: Ongoing (interventions implemented as appropriate)   05/01/18 1607   Coping/Psychosocial   Plan Of Care Reviewed With patient   Plan of Care Review   Progress progress toward functional goals as expected   Outcome Summary PT eval completed       Problem: Acute Therapy Services Goal & Intervention Plan  Goal: Bed Mobility Goal  Outcome: Ongoing (interventions implemented as appropriate)    Goal: Gait Training Goal  Outcome: Ongoing (interventions implemented as appropriate)    Goal: Stairs Goal  Outcome: Ongoing (interventions implemented as appropriate)    Goal: Transfer Training Goal  Outcome: Ongoing (interventions implemented as appropriate)      Problem: Mobility, Physical Impaired (Adult)  Goal: Identify Related Risk Factors and Signs and Symptoms  Outcome: Outcome(s) Achieved Date Met: 05/01/18

## 2018-05-01 NOTE — CONSULTS
Subjective     Interval History: Feels well. Pain is manageable. Reports palps the past few weeks but denies any CP/SOB or other cardiac   Sx    Objective     Vital signs in last 24 hours:  Temp:  [36.2 °C (97.1 °F)-36.6 °C (97.9 °F)] 36.2 °C (97.1 °F)  Heart Rate:  [62-72] 68  Resp:  [11-30] 22  BP: (112-179)/(61-88) 140/82      Intake/Output Summary (Last 24 hours) at 05/01/18 1425  Last data filed at 05/01/18 1105   Gross per 24 hour   Intake             1000 ml   Output              100 ml   Net              900 ml       •  acetaminophen, 650 mg, oral, q4h PRN **OR** acetaminophen, 650 mg, rectal, q4h PRN **OR** acetaminophen, 650 mg, oral, q4h PRN  •  acetaminophen, 650 mg, oral, q6h INT  •  albuterol, 2.5 mg, nebulization, q6h PRN  •  alum-mag hydroxide-simeth, 30 mL, oral, q4h PRN  •  [START ON 5/2/2018] amLODIPine, 5 mg, oral, q AM  •  aspirin, 81 mg, oral, BID  •  atorvastatin, 20 mg, oral, q PM  •  bisacodyl, 10 mg, rectal, Daily PRN  •  ceFAZolin, 2 g, intravenous, q8h INT  •  [START ON 5/2/2018] chlorthalidone, 25 mg, oral, q AM  •  [START ON 5/7/2018] cloNIDine, 1 patch, transdermal, Mon  •  [START ON 5/2/2018] dexamethasone, 6 mg, intravenous, Once  •  glucose, 16-32 g of dextrose, oral, PRN **OR** dextrose, 15-30 g of dextrose, oral, PRN **OR** glucagon, 1 mg, intramuscular, PRN **OR** dextrose in water, 25 mL, intravenous, PRN  •  potassium chloride-D5-0.9%NaCl, 125 mL/hr, intravenous, Continuous  •  diphenhydrAMINE, 25 mg, oral, q6h PRN **OR** diphenhydrAMINE, 25 mg, intravenous, q6h PRN  •  HYDROmorphone, 0.5-1 mg, intravenous, q3h PRN  •  ketorolac, 7.5 mg, intravenous, q6h NATHALIA  •  [START ON 5/2/2018] losartan, 50 mg, oral, q AM  •  multivitamin, 1 tablet, oral, q PM  •  ondansetron ODT, 4 mg, oral, q8h PRN **OR** ondansetron, 4 mg, intravenous, q8h PRN  •  ondansetron ODT, 4 mg, oral, Daily (6a)  •  oxyCODONE, 5-10 mg, oral, q4h PRN  •  [START ON 5/2/2018] pantoprazole, 40 mg, oral, Daily before  breakfast  •  polyethylene glycol, 17 g, oral, Daily  •  polyethylene glycol, 17 g, oral, Daily PRN  •  senna, 1 tablet, oral, 2x daily PRN  •  sennosides-docusate sodium, 1 tablet, oral, BID  •  [START ON 5/2/2018] sertraline, 50 mg, oral, q AM  •  sodium chloride 0.9 %, , intravenous, Continuous  •  traMADol,  mg, oral, q6h PRN  •  trimethobenzamide, 200 mg, intramuscular, q8h PRN        Labs  I have reviewed the patient's labs.         Imaging  I have independently reviewed the pertinent imaging from the last 24 hrs.        Physical Exam:  /82 (BP Location: Right upper arm, Patient Position: Lying)   Pulse 68   Temp 36.2 °C (97.1 °F) (Oral)   Resp (!) 22   Wt 104 kg (230 lb)   SpO2 97%   BMI 37.12 kg/m²     General Appearance:  Alert, no distress   Head:  Normocephalic, without obvious abnormality, atraumatic   Eyes:  Conjunctiva/corneas clear, EOM's intact   Neck: No JVD. No carotid bruit.   Lungs:   Clear to auscultation bilaterally, respirations unlabored, no rales, no wheezing   Heart:  Regular rhythm, S1 and S2 normal, EULOGIO at Sb, no rub or gallop   Abdomen:   Soft, non-tender, no masses, no organomegaly   Neuro: Non-focal     Skin:   No rashes or lesions.   Extremities: No edema   Behavior/Emotional: Appropriate, cooperative     .    A/P:    1.  Status post left hip replacement.  Per orthopedics.  Continue aspirin for DVT prophylaxis.  2.  Hypertension.  Chlorthalidone is on hold.  Please continue her home amlodipine, losartan, and clonidine with hold parameters.  3.  Hyperlipidemia.  Continue atorvastatin.  4.  Coronary artery disease.  By calcium score.  No angina.  Continue aspirin and statin.  5.  PACs.  Please replete potassium to greater than 4.  At home she takes 10 mEq Sunday, Tuesday, Thursday and 20 mEq the other days.

## 2018-05-01 NOTE — ANESTHESIA PROCEDURE NOTES
Spinal Block    Patient location during procedure: holding area  Start time: 5/1/2018 9:25 AM  End time: 5/1/2018 9:26 AM  Reason for block: at surgeon's request  Staffing  Anesthesiologist: PRETTY BRIZUELA  Resident/CRNA: SNEHA FARRELL  Performed: anesthesiologist   Preanesthetic Checklist  Completed: patient identified, surgical consent, pre-op evaluation, timeout performed, IV checked, risks and benefits discussed and monitors and equipment checked  Spinal Block  Patient position: sitting  Prep: ChloraPrep and site prepped and draped  Patient monitoring: heart rate, cardiac monitor, continuous pulse ox and blood pressure  Approach: midline  Location: L3-4  Injection technique: single-shot  Needle  Needle type: pencil-tip   Needle gauge: 25 G  Assessment  Sensory level: t6.  Events: cerebrospinal fluid  Additional Notes  Procedure well tolerated. Vital signs stable.

## 2018-05-01 NOTE — PROGRESS NOTES
Patient: Barbara Landeros  Location: [place] [address]  MRN: [id number]  Today's date: 5/1/2018  Post session, pt in bed w/HOB elevated, call bell closeby, SCD's on, pt on sup O2, and bed alarm on.         Pain/Vitals     Row Name 05/01/18 6396          Pain/Comfort/Sleep    Presence of Pain complains of pain/discomfort     Pain Body Location hip     Pain Rating (0-10): Rest 2     Pain Rating (0-10): Activity 2        Vital Signs    Pulse 87     SpO2 97 %   post activity     Oxygen Therapy None (Room air)   Pt placed back on sup O2 post activity     BP (!)  143/70           Prior Living Environment  Lives With: spouse  Living Arrangements: houseNumber of Stairs, Main Entrance: eightEquipment Currently Used at Home:  (has RW, was not using AD)Location, Patient Bedroom: second floor, must negotiate stairs to accessLocation, Bathroom: second floor, must negotiate stairs to access  Bathroom Access Comment: tub  Stairs, Within Home, Primary: FF w/railing      Prior Level of Function  Ambulation: independent  Transferring: independent  Toileting: independent  Bathing: independent  Dressing: independent  Eating: independent  Communication: understands/communicates without difficulty  Swallowing: swallows foods/liquids without difficulty  Equipment Currently Used at Home:  (has RW, was not using AD)  Prior Functional Level Comment: Pt was ind w/func  mob w/o AD.            PT Evaluation - 05/01/18 1556        Session Details    Document Type initial evaluation    Mode of Treatment physical therapy;individual therapy       Time Calculation    Start Time 1531    Stop Time 1556    Time Calculation (min) 25 min       General Information    Patient Profile Reviewed? yes    Onset of Illness/Injury or Date of Surgery --   admit L hip DJD s/p L CON    Pertinent History of Current Functional Problem PMH includes anxiety, H pylori, HTN, pericarditis, R CON per pt     Existing Precautions/Restrictions fall;hip        Orientation Log    Comment AXOX3       Sensory    Sensory General Assessment --   LT BLE intact       Range of Motion (ROM)    Comment, General Range of Motion 0-90 b/l hips, b/l knees 0-90, ankles wfl        Manual Muscle Testing (MMT)    Comment dem 3/5 B/L hips and knees, ankles 3/5        Bed Mobility/Transfers    Left LE Weight Bearing Status weight bearing as tolerated       Bed Chair WC Transfer Training    Sit-Stand Transfers, [place] 1 person assist;minimum assist (75% patient effort)    Stand-Sit Transfers, [place] 1 person assist;minimum assist (75% patient effort)    Supine to Sit, [place] supervision    Sit to Supine, [place] supervision       Gait Training    [place] 1 person assist;minimum assist (75% or more patient effort)    Assistive Device walker, front-wheeled    Distance in Feet 200 feet    Gait Pattern Utilized step-through    Gait Deviations Identified decreased step length;decreased gait speed    Comment able to step thru, denies increased pain        AM-PAC (TM) - Mobility    Turning from your back to your side while in a flat bed without using bedrails? 3 - A Little    Moving from lying on your back to sitting on the side of a flat bed without using bedrails? 3 - A Little    Moving to and from a bed to a chair? 3 - A Little    Standing up from a chair using your arms? 3 - A Little    To walk in a hospital room? 3 - A Little    Climbing 3-5 steps with a railing? 3 - A Little    AM-PAC (TM) Mobility Score 18       PT Clinical Impression    Patient's Goals For Discharge return to all previous roles/activities    Plan For Care Reviewed: Physical Therapy PT plan for care discussed with patient    Impairments Found (PT Eval) aerobic capacity/endurance;gait, locomotion, and balance    Functional Limitations in Following Categories (PT Eval) self-care;home management    Rehab Potential/Prognosis good, to achieve stated therapy goals    PT Frequency of Treatment 5-7  times per week    Problem List --   mild decreased endurance    Anticipated Equipment Needs at Discharge --   has RW    Expected Discharge Disposition home with assist    Daily Outcome Statement 5/1/18 Pt dem good activity tolerance. Denies increased pain w/activity.  Expect steady progress. Rec increased A at home.                    Education provided this session. See the Patient Education summary report for full details.    PT Care Plan Goals    Flowsheet Row Most Recent Value   Stair Goal, PT   PT STG: Stairs  independent   STG Number of Stairs  12   Physical Therapy STG Date Established: Stairs  05/01/18   PT STG Duration: Stairs  7 days or less      PT Care Plan Goals    Flowsheet Row Most Recent Value   Bed Mobility Goal   Date Goal Established: Bed Mobility  05/01/18   Time to Achieve Goal: Bed Mobility  5 - 7 days   Goal Activity: Bed Mobility  all bed mobility activities   Goal Level of [place]: Bed Mobility  independent   Gait Training Goal   Date Goal Established: Gait Training  05/01/18   Time to Achieve Goal: Gait Training  5 - 7 days   Level of [place] Goal: Gait Training  independent   Assistive Device Used: Gait Training  walker, rolling   Distance Goal: Gait Training (feet)  250 feet   Goal Transfer Training   Date Goal Established: Transfer Training  05/01/18   Time to Achieve Goal: Transfer Training  5 - 7 days   Goal Activity: Transfer Training  bed-to-chair/chair-to-bed, sit-to-stand/stand-to-sit   Transfer Training Goal, [place] Level  independent

## 2018-05-02 ENCOUNTER — APPOINTMENT (INPATIENT)
Dept: PHYSICAL THERAPY | Facility: HOSPITAL | Age: 63
DRG: 470 | End: 2018-05-02
Payer: COMMERCIAL

## 2018-05-02 ENCOUNTER — APPOINTMENT (INPATIENT)
Dept: OCCUPATIONAL THERAPY | Facility: HOSPITAL | Age: 63
DRG: 470 | End: 2018-05-02
Attending: PHYSICIAN ASSISTANT
Payer: COMMERCIAL

## 2018-05-02 VITALS
TEMPERATURE: 98 F | HEIGHT: 66 IN | BODY MASS INDEX: 37.56 KG/M2 | WEIGHT: 233.69 LBS | DIASTOLIC BLOOD PRESSURE: 81 MMHG | HEART RATE: 62 BPM | RESPIRATION RATE: 18 BRPM | SYSTOLIC BLOOD PRESSURE: 145 MMHG | OXYGEN SATURATION: 97 %

## 2018-05-02 LAB
ANION GAP SERPL CALC-SCNC: 8 MEQ/L (ref 3–15)
BUN SERPL-MCNC: 13 MG/DL (ref 8–20)
CALCIUM SERPL-MCNC: 9 MG/DL (ref 8.9–10.3)
CHLORIDE SERPL-SCNC: 104 MMOL/L (ref 98–109)
CO2 SERPL-SCNC: 27 MMOL/L (ref 22–32)
CREAT SERPL-MCNC: 0.8 MG/DL (ref 0.6–1.1)
ERYTHROCYTE [DISTWIDTH] IN BLOOD BY AUTOMATED COUNT: 13.7 % (ref 11.7–14.4)
GFR SERPL CREATININE-BSD FRML MDRD: >60 ML/MIN/1.73M*2
GLUCOSE SERPL-MCNC: 121 MG/DL (ref 70–99)
HCT VFR BLDCO AUTO: 33.5 % (ref 35–45)
HGB BLD-MCNC: 10.9 G/DL (ref 11.8–15.7)
MCH RBC QN AUTO: 29.1 PG (ref 28–33.2)
MCHC RBC AUTO-ENTMCNC: 32.5 G/DL (ref 32.2–35.5)
MCV RBC AUTO: 89.6 FL (ref 83–98)
PDW BLD AUTO: 10.3 FL (ref 9.4–12.3)
PLATELET # BLD AUTO: 226 K/UL (ref 150–369)
POTASSIUM SERPL-SCNC: 4.1 MMOL/L (ref 3.6–5.1)
RBC # BLD AUTO: 3.74 M/UL (ref 3.93–5.22)
SODIUM SERPL-SCNC: 139 MMOL/L (ref 136–144)
WBC # BLD AUTO: 14.77 K/UL (ref 3.8–10.5)

## 2018-05-02 PROCEDURE — 97116 GAIT TRAINING THERAPY: CPT | Mod: GP

## 2018-05-02 PROCEDURE — 25800000 HC PHARMACY IV SOLUTIONS: Performed by: PHYSICIAN ASSISTANT

## 2018-05-02 PROCEDURE — 63700000 HC SELF-ADMINISTRABLE DRUG: Performed by: PHYSICIAN ASSISTANT

## 2018-05-02 PROCEDURE — 97165 OT EVAL LOW COMPLEX 30 MIN: CPT | Mod: GO

## 2018-05-02 PROCEDURE — 97535 SELF CARE MNGMENT TRAINING: CPT | Mod: GO

## 2018-05-02 PROCEDURE — 63600000 HC DRUGS/DETAIL CODE: Performed by: PHYSICIAN ASSISTANT

## 2018-05-02 PROCEDURE — 36415 COLL VENOUS BLD VENIPUNCTURE: CPT | Performed by: PHYSICIAN ASSISTANT

## 2018-05-02 PROCEDURE — 80048 BASIC METABOLIC PNL TOTAL CA: CPT | Performed by: PHYSICIAN ASSISTANT

## 2018-05-02 PROCEDURE — 85027 COMPLETE CBC AUTOMATED: CPT | Performed by: PHYSICIAN ASSISTANT

## 2018-05-02 RX ORDER — ACETAMINOPHEN 325 MG/1
650 TABLET ORAL EVERY 4 HOURS PRN
Start: 2018-05-02 | End: 2018-06-01

## 2018-05-02 RX ORDER — POTASSIUM CHLORIDE 750 MG/1
20 TABLET, FILM COATED, EXTENDED RELEASE ORAL 3 TIMES WEEKLY
Status: DISCONTINUED | OUTPATIENT
Start: 2018-05-03 | End: 2018-05-02

## 2018-05-02 RX ORDER — AMOXICILLIN 250 MG
1 CAPSULE ORAL 2 TIMES DAILY PRN
Qty: 179 TABLET | Refills: 0
Start: 2018-05-02 | End: 2020-09-24 | Stop reason: ENTERED-IN-ERROR

## 2018-05-02 RX ORDER — TRAMADOL HYDROCHLORIDE 50 MG/1
50-100 TABLET ORAL EVERY 6 HOURS PRN
Qty: 30 TABLET | Refills: 0 | Status: SHIPPED | OUTPATIENT
Start: 2018-05-02 | End: 2018-05-07

## 2018-05-02 RX ORDER — POLYETHYLENE GLYCOL 3350 17 G/17G
17 POWDER, FOR SOLUTION ORAL DAILY PRN
Start: 2018-05-02 | End: 2018-06-01

## 2018-05-02 RX ORDER — NAPROXEN SODIUM 220 MG/1
81 TABLET, FILM COATED ORAL 2 TIMES DAILY
Qty: 60 TABLET | Refills: 0
Start: 2018-05-02 | End: 2018-06-01

## 2018-05-02 RX ORDER — ASPIRIN 81 MG/1
81 TABLET ORAL DAILY
Qty: 30 TABLET | Refills: 0
Start: 2018-05-02 | End: 2018-06-01

## 2018-05-02 RX ORDER — OXYCODONE HYDROCHLORIDE 5 MG/1
5-10 TABLET ORAL EVERY 4 HOURS PRN
Qty: 30 TABLET | Refills: 0 | Status: SHIPPED | OUTPATIENT
Start: 2018-05-02 | End: 2018-05-07

## 2018-05-02 RX ORDER — POTASSIUM CHLORIDE 750 MG/1
10 TABLET, FILM COATED, EXTENDED RELEASE ORAL 3 TIMES WEEKLY
Status: DISCONTINUED | OUTPATIENT
Start: 2018-05-03 | End: 2018-05-02

## 2018-05-02 RX ADMIN — DEXAMETHASONE SODIUM PHOSPHATE 6 MG: 4 INJECTION, SOLUTION INTRA-ARTICULAR; INTRALESIONAL; INTRAMUSCULAR; INTRAVENOUS; SOFT TISSUE at 09:14

## 2018-05-02 RX ADMIN — POTASSIUM CHLORIDE 20 MEQ: 750 TABLET, FILM COATED, EXTENDED RELEASE ORAL at 09:15

## 2018-05-02 RX ADMIN — ASPIRIN 81 MG: 81 TABLET, CHEWABLE ORAL at 09:16

## 2018-05-02 RX ADMIN — KETOROLAC TROMETHAMINE 7.5 MG: 30 INJECTION, SOLUTION INTRAMUSCULAR at 06:29

## 2018-05-02 RX ADMIN — KETOROLAC TROMETHAMINE 7.5 MG: 30 INJECTION, SOLUTION INTRAMUSCULAR at 00:54

## 2018-05-02 RX ADMIN — SERTRALINE 50 MG: 50 TABLET, FILM COATED ORAL at 09:16

## 2018-05-02 RX ADMIN — ONDANSETRON 4 MG: 4 TABLET, ORALLY DISINTEGRATING ORAL at 06:29

## 2018-05-02 RX ADMIN — POTASSIUM CHLORIDE, DEXTROSE MONOHYDRATE AND SODIUM CHLORIDE 125 ML/HR: 150; 5; 900 INJECTION, SOLUTION INTRAVENOUS at 04:38

## 2018-05-02 RX ADMIN — PANTOPRAZOLE SODIUM 40 MG: 40 TABLET, DELAYED RELEASE ORAL at 09:16

## 2018-05-02 RX ADMIN — POLYETHYLENE GLYCOL 3350 17 GRAM ORAL POWDER PACKET 17 G: at 09:15

## 2018-05-02 RX ADMIN — LOSARTAN POTASSIUM 50 MG: 50 TABLET, FILM COATED ORAL at 09:22

## 2018-05-02 RX ADMIN — Medication 2 G: at 00:54

## 2018-05-02 RX ADMIN — ACETAMINOPHEN 650 MG: 325 TABLET, FILM COATED ORAL at 00:55

## 2018-05-02 RX ADMIN — TRAMADOL HYDROCHLORIDE 100 MG: 50 TABLET, COATED ORAL at 00:55

## 2018-05-02 RX ADMIN — ACETAMINOPHEN 650 MG: 325 TABLET, FILM COATED ORAL at 09:15

## 2018-05-02 RX ADMIN — SENNOSIDES AND DOCUSATE SODIUM 1 TABLET: 8.6; 5 TABLET ORAL at 09:23

## 2018-05-02 RX ADMIN — CHLORTHALIDONE 25 MG: 25 TABLET ORAL at 09:16

## 2018-05-02 RX ADMIN — AMLODIPINE BESYLATE 5 MG: 5 TABLET ORAL at 09:16

## 2018-05-02 ASSESSMENT — COGNITIVE AND FUNCTIONAL STATUS - GENERAL
STANDING UP FROM CHAIR USING ARMS: 4 - NONE
EATING MEALS: 4 - NONE
DRESSING REGULAR LOWER BODY CLOTHING: 4 - NONE
TOILETING: 4 - NONE
WALKING IN HOSPITAL ROOM: 4 - NONE
DRESSING REGULAR UPPER BODY CLOTHING: 4 - NONE
CLIMB 3 TO 5 STEPS WITH RAILING: 4 - NONE
MOVING TO AND FROM BED TO CHAIR: 4 - NONE
HELP NEEDED FOR BATHING: 4 - NONE
HELP NEEDED FOR PERSONAL GROOMING: 4 - NONE

## 2018-05-02 NOTE — PLAN OF CARE
Problem: Fall Risk (Adult)  Goal: Identify Related Risk Factors and Signs and Symptoms  Outcome: Ongoing (interventions implemented as appropriate)   05/01/18 1178   Fall Risk   Related Risk Factors (Fall Risk) gait/mobility problems   Signs and Symptoms (Fall Risk) presence of risk factors

## 2018-05-02 NOTE — PLAN OF CARE
Problem: Patient Care Overview  Goal: Plan of Care Review  Outcome: Ongoing (interventions implemented as appropriate)   05/01/18 5165   Coping/Psychosocial   Plan Of Care Reviewed With patient   Plan of Care Review   Progress progress toward functional goals as expected       Problem: Fall Risk (Adult)  Goal: Absence of Falls  Outcome: Ongoing (interventions implemented as appropriate)

## 2018-05-02 NOTE — PROGRESS NOTES
Patient: Barbara Landeros   MRN: [id number]   : 1955       Daily Progress Note  (LOS: 1 days) 1 Day Post-Op s/p Procedure(s):  Left Total Hip Arthroplasty    Subjective     62 y.o. y/o female s/p Procedure(s):  Left Total Hip Arthroplasty.     Patient is doing well. Pain is controlled. Denies chest pain, shortness of breath. Denies nausea or vomiting. Admits to flatus but no BM. Voiding without difficulties. Tolerating diet. OOB with PT today. Denies fever or chills.     Patient Active Problem List   Diagnosis   • Essential hypertension   • Dyslipidemia   • CAD (coronary artery disease)   • Pericarditis   • CHUY (obstructive sleep apnea)   • Degenerative joint disease of left hip       Objective     LAST VITALS:    Vitals:    18 0400 18 0546 18 0600 18 0817   BP:  (!) 145/81     BP Location:  Right upper arm     Patient Position:  Lying     Pulse:  62     Resp: 18 15 18    Temp:  36.7 °C (98 °F)     TempSrc:  Oral     SpO2: 99%  98% 97%   Weight:       Height:           Temp (24hrs), Av.4 °C (97.6 °F), Min:36.2 °C (97.1 °F), Max:36.7 °C (98 °F)        Intake/Output Summary (Last 24 hours) at 18 1044  Last data filed at 18 0737   Gross per 24 hour   Intake             2720 ml   Output             1600 ml   Net             1120 ml      I/O this shift:  In: -   Out: 600 [Urine:600]    Allergies: Prevpac [[id number]]       PHYSICAL EXAM:    Incision: clean, dry and intact with expected postop edema and ecchymosis. + 5/5 DF/PF/EHL bilateral. +NVI with sensation intact. Distal Pulses: 2+ bilateral,Calves: soft, non tender bilateral      LABS:       Results from last 7 days  Lab Units 18  0809   WBC K/uL 14.77*   HEMOGLOBIN g/dL 10.9*   HEMATOCRIT % 33.5*   PLATELETS K/uL 226   SODIUM mmol/L 139   POTASSIUM mmol/L 4.1   CHLORIDE mmol/L 104   CO2 mmol/L 27   BUN mg/dL 13   CREATININE mg/dL 0.8   GLUCOSE mg/dL 121*   CALCIUM mg/dL 9.0          Assessment/Plan     62 y.o. y/o female s/p Procedure(s):  Left Total Hip Arthroplasty     1 Day Post-Op     1. DVT prophylaxis: Asprin 81 mg po BID for 4 weeks and bilateral SCD  2. PT/OT per protocol  3. Incentive spirometer  4. Pain management: Scheduled: tylenol and  toradol PRN: oxycodone, tramadol   5. Bowel regimen: senokot, colace, miralax  6. Leukocytosis:  Probably due to inflammatory response to surgery and decadron which pt received intra op and this am.  7. Cardiology: appreciate recommendations     Dispo: home d/c today    SIMONA Phillips  5/2/2018  10:44 AM           .

## 2018-05-02 NOTE — PROGRESS NOTES
Patient: Barbara Landeros      Anesthesia post-operative note:    Patient seen, evaluated;  No complaints related to anesthesia care;  Post-anesthesia pain: adequate analgesia  Vitals stable;  Cardiovascular function is stable;  No nausea/vomiting;  Hydration adequate;  Level of consciousness: awake,alert,oriented;  Neuro exam - WNL;  Respiration - WNL;  No apparent anesthetic complications.      No further follow-up is indicated;  Call anesthesia service if have further questions.

## 2018-05-02 NOTE — PLAN OF CARE
Problem: Mobility, Physical Impaired (Adult)  Goal: Enhanced Mobility Skills  Outcome: Ongoing (interventions implemented as appropriate)   05/01/18 5756   Mobility, Physical Impaired (Adult)   Enhanced Mobility Skills making progress toward outcome

## 2018-05-02 NOTE — PLAN OF CARE
Problem: Mobility, Physical Impaired (Adult)  Goal: Enhanced Functionality Ability  Outcome: Ongoing (interventions implemented as appropriate)   05/01/18 7808   Mobility, Physical Impaired (Adult)   Enhanced Functionality Ability making progress toward outcome

## 2018-05-02 NOTE — DISCHARGE SUMMARY
Ortho Discharge Summary    Admitting Provider: Sandeep Mckee MD  Discharge Provider: Sandeep Mckee MD  Primary Care Physician at Discharge: Tenzin Luevano SR,  [phone]     Admission Date: 5/1/2018     Discharge Date: 5/2/2018    Discharge Disposition    Code Status at Discharge: Full Code    Discharge Medications     Medication List      START taking these medications    acetaminophen 325 mg tablet  Commonly known as:  TYLENOL  Take 2 tablets (650 mg total) by mouth every 4 (four) hours as needed for mild pain, headaches or fever.     oxyCODONE 5 mg immediate release tablet  Commonly known as:  ROXICODONE  Take 1-2 tablets (5-10 mg total) by mouth every 4 (four) hours as needed for severe pain for up to 5 days Earliest Fill Date: 5/2/18.     polyethylene glycol 17 gram packet  Commonly known as:  MIRALAX  Take 17 g by mouth daily as needed (constipation).     sennosides-docusate sodium 8.6-50 mg  Commonly known as:  SENOKOT-S  Take 1 tablet by mouth 2 (two) times a day as needed for constipation for up to 179 doses.     traMADol 50 mg tablet  Commonly known as:  ULTRAM  Take 1-2 tablets ( mg total) by mouth every 6 (six) hours as needed for moderate pain for up to 5 days.        STOP taking these medications    ferrous sulfate 325 mg (65 mg iron) tablet        CHANGE how you take these medications    * aspirin 81 mg enteric coated tablet  Commonly known as:  ASPIR-81  Take 1 tablet (81 mg total) by mouth daily. Hold for 4 weeks while taking aspirin 81 mg twice a day  What changed:  · how to take this  · when to take this  · additional instructions     * aspirin 81 mg chewable tablet  Take 1 tablet (81 mg total) by mouth 2 (two) times a day for 60 doses. Take for 4 weeks. Obtain over the counter  What changed:  You were already taking a medication with the same name, and this prescription was added. Make sure you understand how and when to take each.        * This list has 2 medication(s)  that are the same as other medications prescribed for you. Read the directions carefully, and ask your doctor or other care provider to review them with you.               Where to Get Your Medications      These medications were sent to [facility] - [place] - [address]  [address], [place] [address]    Phone:  [phone]   · traMADol 50 mg tablet     You can get these medications from any pharmacy    Bring a paper prescription for each of these medications  · oxyCODONE 5 mg immediate release tablet     Information about where to get these medications is not yet available    Ask your nurse or doctor about these medications  · acetaminophen 325 mg tablet  · aspirin 81 mg chewable tablet  · aspirin 81 mg enteric coated tablet  · polyethylene glycol 17 gram packet  · sennosides-docusate sodium 8.6-50 mg         Active Issues Requiring Follow-up  Follow-up Appointments Arranged: Yes     Outpatient Follow-Up  Future Appointments  Date Time Provider Department Center   5/2/2018 1:15 PM Gabriel Carvalho, [facility] LM       Referrals and Follow-ups to Schedule     Lifting Restrictions (Specify)       Maximum Weigth to Lift:  10 Pounds        Test Results Pending at Discharge      DETAILS OF HOSPITAL STAY    Presenting Problem/History of Present Illness  Degenerative joint disease of left hip [M16.12]      Hospital Course  DISCHARGE SUMMARY    The patient is a 62 y.o. female who presented with a history of severe hip pain.  Dr. Dr. Mckee recommended Procedure(s):  Left Total Hip Arthroplasty. The patient underwent the procedure on 5/1/2018 and tolerated the procedure well. Details of the procedure can be found in the operative summary. The patient’s post-operative course was unremarkable. The patient was seen postoperatively by Cardiolgy, PT, OT and social work and progressed to the point that on the day of discharge was tolerating a  house diet, voiding without difficulty and ambulating with assistance. At the time of discharge the patient’s vital signs were stable with a clean, dry and intact incision. The patient was discharged on Asprin 81 mg po BID for 4 weeks for DVT prophylaxis, continue pain medications, resume preoperative medications and to follow up with their surgeon. The patient was advised to call the office with any problems including drainage from the incision, redness around the incision, worsening pain and fever greater than 101 degrees F.       Operative Procedures Performed  Procedure(s):  Left Total Hip Arthroplasty  Consults: cardiology

## 2018-05-02 NOTE — DISCHARGE INSTRUCTIONS
TOTAL HIP REPLACEMENT DISCHARGE INSTRUCTIONS  You have just had a total hip replacement and it is important to follow these specific rules to ensure your safety.  Please look over these instructions before your discharge and ask questions about anything you do not understand.  Call [facility] at: # [phone] to schedule your post-operative for a post-operative appointment in 2-4 weeks with Dr. Mckee.  You will need a postoperative x-ray before your visit with Dr. Mckee which will be done in the office at our post-operative visit.  1) Sleep on your back with a pillow between your legs until your first follow-up appointment.    2) Continue to use your walker/crutches as instructed by Dr. Mckee and the physical therapist.  You may walk indoors or outdoors.    3)  Climb stairs as you have been instructed. To go UP STAIRS, lead with your un-operated leg first. To go DOWN STAIRS, lead with your operated leg first.    4) Use your toilet seat extension    5)  Do the exercises as instructed by Dr. Mckee and the physical therapist.    6) No tub bath.  You may shower as long as the incision is not draining. Your incision may be washed with mild soap and water but do not use deodorant soaps. Do not rub the incision; just let the water run over it.  Lightly pat dry with a towel.      7) No ointments or creams on incision site for 6 weeks    8) Do not cover your incision unless it is draining    9) Avoid sitting in low or soft seats.  Sit in a chair that will prevent you from flexing your hip greater than 90-degree angle.  A hip chair is best.    10)  Avoid crossing your legs or bending your hip beyond 90-degree angle.  Avoid allowing your feet to turn inwards (pigeon-toed).  Note any other restrictions given at discharge by the physical therapist and Dr. Mckee.  Hip dislocation precautions should be maintained for 3 months.    11)  Avoid all car rides, either as a passenger or   other than the initial drive home    upon discharge, until your first follow-up visit.    12) No driving for at least 3 to 4 weeks.    13)  Take only the medications prescribed by Dr Mckee listed on your discharge medication list.    14)  Ice area several times daily.  Do not place ice directly on the incision site.     15)  Call Dr. Mckee’s office at [phone] for:  • Increased pain, redness, warmth, swelling, or drainage from your incision  • Temperature elevation above 100.5 degrees  • Signs of dislocation (shortening of your operated leg, leg turning outward; inability to move your leg; feeling that your leg has slipped inside).    16)   will help you arrange your equipment for discharge.  Let your nurse know if the  has not contacted you prior to discharge.    17)   Whenever you have an operative or invasive procedure (dental, urologic, podiatric, etc.), check with your physician to remind her/him that you have a total hip prosthesis and may need antibiotics before the procedure.  This will be reviewed at your first post-operative visit.      Questions? - Call Claudia Graves RN - # [phone]

## 2018-05-02 NOTE — PLAN OF CARE
Problem: Fall Risk (Adult)  Goal: Absence of Falls  Outcome: Ongoing (interventions implemented as appropriate)   05/01/18 6637   Fall Risk (Adult)   Absence of Falls making progress toward outcome

## 2018-05-02 NOTE — PROGRESS NOTES
Patient: Barbara Landeros  Location: [place] [address]  MRN: [id number]  Today's date: 5/2/2018    Concluded session with pt sitting in bed side chair with RN remote in hand.      Prior Living Environment  Lives With: spouse  Living Arrangements: houseNumber of Stairs, Main Entrance: eightEquipment Currently Used at Home:  (has RW, was not using AD)Location, Patient Bedroom: second floor, must negotiate stairs to accessLocation, Bathroom: second floor, must negotiate stairs to access  Bathroom Access Comment: tub  Stairs, Within Home, Primary: FF w/railing      Prior Level of Function  Ambulation: independent  Transferring: independent  Toileting: independent  Bathing: independent  Dressing: independent  Eating: independent  Communication: understands/communicates without difficulty  Swallowing: swallows foods/liquids without difficulty  Equipment Currently Used at Home:  (has RW, was not using AD)  Prior Functional Level Comment: Pt was ind w/func  mob w/o AD.            PT Treatment Summary - 05/02/18 0809        Session Details    Document Type daily treatment    Mode of Treatment individual therapy;physical therapy       Time Calculation    Start Time 0754    Stop Time 0807    Time Calculation (min) 13 min       General Information    Patient Profile Reviewed? yes       Bed Chair WC Transfer Training    Sit-Stand Transfers, [place] independent    Stand-Sit Transfers, [place] independent    Supine to Sit, [place] independent       Gait Training    [place] modified independence    Assistive Device walker, front-wheeled    Distance in Feet 200 feet    Gait Pattern Utilized step-through    Gait Deviations Identified decreased aamir;decreased gait speed;decreased heel strike;decreased step length;decreased stride length;decreased weight shifting       Stairs Training    [place] modified independence    Stairs, Assistive Device railing    Stairs, Handrail Location right  side (ascending)    Number of Stairs 9    Ascending Stairs Technique step-to-step    Descending Stairs Technique step-to-step       AM-PAC (TM) - Mobility    Turning from your back to your side while in a flat bed without using bedrails? 4 - None    Moving from lying on your back to sitting on the side of a flat bed without using bedrails? 4 - None    Moving to and from a bed to a chair? 4 - None    Standing up from a chair using your arms? 4 - None    To walk in a hospital room? 4 - None    Climbing 3-5 steps with a railing? 4 - None    AM-PAC (TM) Mobility Score 24       PT Clinical Impression    Plan For Care Reviewed: Physical Therapy patient voices agreement with PT plan for care    Impairments Found (PT Eval) gait, locomotion, and balance    Expected Discharge Disposition home    Daily Outcome Statement Pt doing well from a mobility standpoint and appears safe to return home.                    Education provided this session. See the Patient Education summary report for full details.    PT Care Plan Goals    Flowsheet Row Most Recent Value   Stair Goal, PT   PT STG: Stairs  independent   STG Number of Stairs  12   Physical Therapy STG Date Established: Stairs  05/01/18   PT STG Duration: Stairs  7 days or less      PT Care Plan Goals    Flowsheet Row Most Recent Value   Bed Mobility Goal   Date Goal Established: Bed Mobility  05/01/18   Time to Achieve Goal: Bed Mobility  5 - 7 days   Goal Activity: Bed Mobility  all bed mobility activities   Goal Level of [place]: Bed Mobility  independent   Gait Training Goal   Date Goal Established: Gait Training  05/01/18   Time to Achieve Goal: Gait Training  5 - 7 days   Level of [place] Goal: Gait Training  independent   Assistive Device Used: Gait Training  walker, rolling   Distance Goal: Gait Training (feet)  250 feet   Goal Transfer Training   Date Goal Established: Transfer Training  05/01/18   Time to Achieve Goal: Transfer Training  5 - 7 days   Goal  Activity: Transfer Training  bed-to-chair/chair-to-bed, sit-to-stand/stand-to-sit   Transfer Training Goal, [place] Level  independent

## 2018-05-02 NOTE — PROGRESS NOTES
Patient discussed in CPR. She is anticipated for d/c home today, no needs. Will continue to follow to assist with any skilled d/c needs. -- Ghazal Jordan RN CC 0730

## 2018-05-02 NOTE — PLAN OF CARE
Problem: Patient Care Overview  Goal: Plan of Care Review  Outcome: Adequate for Discharge   05/01/18 1607 05/02/18 0812   Coping/Psychosocial   Plan Of Care Reviewed With --  patient   Plan of Care Review   Progress --  improving   Outcome Summary PT eval completed --        Problem: Acute Therapy Services Goal & Intervention Plan  Goal: Bed Mobility Goal  Outcome: Adequate for Discharge    Goal: Gait Training Goal  Outcome: Adequate for Discharge    Goal: Stairs Goal  Outcome: Adequate for Discharge    Goal: Transfer Training Goal  Outcome: Adequate for Discharge

## 2018-05-02 NOTE — PROGRESS NOTES
Patient: Barbara Landeros  Location: [place] [address]  MRN: [id number]  Today's date: 5/2/2018    Patient left in chair w/ call bell in reach, chair alarm, RN notified. Issued hip kit, no futher OT needs.     Prior Living Environment  Lives With: spouse  Living Arrangements: house  Living Environment Comment: 3SH, MA in basement, no bathroom main level, FF w/ tub showerNumber of Stairs, Main Entrance: eightEquipment Currently Used at Home:  (has RW, reacher, commode, comfort height toilets, doesnt use)Location, Patient Bedroom: second floor, must negotiate stairs to accessLocation, Bathroom: second floor, must negotiate stairs to access  Bathroom Access Comment: tub  Stairs, Within Home, Primary: FF w/railing      Prior Level of Function  Ambulation: independent  Transferring: independent  Toileting: independent  Bathing: independent  Dressing: independent  Eating: independent  Communication: understands/communicates without difficulty  Swallowing: swallows foods/liquids without difficulty  Equipment Currently Used at Home:  (has RW, reacher, commode, comfort height toilets, doesnt use)  Prior Functional Level Comment: Pt was ind w/func  mob w/o AD.            OT Evaluation - 05/02/18 0945        Session Details    Document Type initial evaluation    Mode of Treatment occupational therapy       Time Calculation    Start Time 0913    Stop Time 0945    Time Calculation (min) 32 min       General Information    Patient Profile Reviewed? yes    Existing Precautions/Restrictions fall;hip       Orientation Log    [facility] 3-->spontaneous/free recall    [facility] Place 3-->spontaneous/free recall    Name of Hospital 3-->spontaneous/free recall    Month 3-->spontaneous/free recall    Date 3-->spontaneous/free recall    Year 3-->spontaneous/free recall    Day of Week 3-->spontaneous/free recall    Clock Time 3-->spontaneous/free recall    Etiology/Event 3-->spontaneous/free recall    Pathology Deficits  3-->spontaneous/free recall    Total Score 30       Range of Motion (ROM)    General Range of Motion no range of motion deficits identified       Manual Muscle Testing (MMT)    General MMT Assessment no strength deficits identified       Bed Mobility/Transfers    Extremity Weight Bearing Status left lower extremity    Left LE Weight Bearing Status weight bearing as tolerated       Bed Chair WC Transfer Training    Bed Mobility Assessment/Interventions supine to sit    Assistive Device (Bed Mobility) bed rails    Sit-Stand Transfers, [place] modified independence   hip chair, w/ cues intially then performed mod I 2nd trial    Stand-Sit Transfers, [place] modified independence       Tub Transfer Training    [place] minimum assist (75% patient effort);1 person assist    Assistive Device (Tub Transfer) shower chair    Transfer Techniques --   sit and spin    Comment instructed on sit and spin method, simulated by therapist and pt,        Toilet Transfer    [place] modified independence    Comment commode       Upper Body Dressing    Upper Body Dressing Tasks don;hospital gown    Upper Body Dressing Position edge of bed sitting    Upper Body [place] independent       Lower Body Dressing    Lower Body Dressing Tasks socks    Comment insturcted on hip kit       Bathing    Bathing Position supported sitting    Assistive Device long-handled sponge    Comment simulated w/ long sponge, mod I       Grooming    Self-Performance washes, rinses and dries hands    Grooming Setup Assistance adaptive equipment set-up    [place] modified independence       AM-PAC (TM) - ADL    Putting on and taking off regular lower body clothing? 4 - None    Bathing? 4 - None    Toileting? 4 - None    Putting on/taking off regular upper body clothing? 4 - None    How much help for taking care of personal grooming? 4 - None    Eating meals? 4 - None    AM-PAC (TM) ADL Score 24       OT Clinical Impression    Patient's  Goals For Discharge return to all previous roles/activities    Anticipated Equipment Needs At Discharge bathing equipment;dressing equipment    Expected Discharge Disposition home with assist   spouse to assist as needed    Daily Outcome Statement Pt mod I ADLs, demos good understanding of hip precautions. issued hip kit, no further OT needs.                    Education provided this session. See the Patient Education summary report for full details.    OT Care Plan Goals    Flowsheet Row Most Recent Value   Bed Mobility Goal   Date Goal Established: Bed Mobility  05/01/18   Time to Achieve Goal: Bed Mobility  5 - 7 days   Goal Activity: Bed Mobility  all bed mobility activities   Goal Level of [place]: Bed Mobility  independent   Goal Transfer Training   Date Goal Established: Transfer Training  05/01/18   Time to Achieve Goal: Transfer Training  5 - 7 days   Goal Activity: Transfer Training  bed-to-chair/chair-to-bed, sit-to-stand/stand-to-sit   Transfer Training Goal, [place] Level  independent

## 2018-06-01 ENCOUNTER — TRANSCRIBE ORDERS (OUTPATIENT)
Dept: SCHEDULING | Age: 63
End: 2018-06-01

## 2018-06-01 DIAGNOSIS — G47.33 OBSTRUCTIVE SLEEP APNEA SYNDROME: Primary | ICD-10-CM

## 2018-06-05 ENCOUNTER — HOSPITAL ENCOUNTER (OUTPATIENT)
Dept: SLEEP MEDICINE | Facility: HOSPITAL | Age: 63
Discharge: HOME | End: 2018-06-05
Attending: INTERNAL MEDICINE
Payer: COMMERCIAL

## 2018-06-05 DIAGNOSIS — G47.33 OBSTRUCTIVE SLEEP APNEA SYNDROME: ICD-10-CM

## 2018-06-05 PROCEDURE — G0399 HOME SLEEP TEST/TYPE 3 PORTA: HCPCS

## 2018-06-07 NOTE — PROGRESS NOTES
Sleep Study Note    Barbara Landeros is a 62 y.o. female    There were no vitals filed for this visit.    Orders Placed This Encounter   Procedures   • Home sleep test       Night 1  6-6-2018  Study signal quality:Good  AHI:2  SHIRA SAO2:88  Additional comments:    Night 2 (if applicable)6-6-2018  AHI:3  Shira SAO2:88  Additional comments:    Ran Cruz  06/07/18 2:19 PM

## 2018-06-25 ENCOUNTER — TRANSCRIBE ORDERS (OUTPATIENT)
Dept: SCHEDULING | Age: 63
End: 2018-06-25

## 2018-06-25 DIAGNOSIS — G47.33 OBSTRUCTIVE SLEEP APNEA: Primary | ICD-10-CM

## 2018-07-09 ENCOUNTER — HOSPITAL ENCOUNTER (OUTPATIENT)
Dept: SLEEP MEDICINE | Facility: HOSPITAL | Age: 63
Discharge: HOME | End: 2018-07-09
Attending: INTERNAL MEDICINE
Payer: COMMERCIAL

## 2018-07-09 DIAGNOSIS — G47.33 OBSTRUCTIVE SLEEP APNEA: ICD-10-CM

## 2018-07-09 PROCEDURE — 95810 POLYSOM 6/> YRS 4/> PARAM: CPT

## 2019-04-15 ENCOUNTER — TRANSCRIBE ORDERS (OUTPATIENT)
Dept: RADIOLOGY | Facility: HOSPITAL | Age: 64
End: 2019-04-15

## 2019-04-15 ENCOUNTER — HOSPITAL ENCOUNTER (OUTPATIENT)
Dept: RADIOLOGY | Facility: HOSPITAL | Age: 64
Discharge: HOME | End: 2019-04-15
Attending: INTERNAL MEDICINE
Payer: MEDICARE

## 2019-04-15 DIAGNOSIS — R07.9 CHEST PAIN: Primary | ICD-10-CM

## 2019-04-15 DIAGNOSIS — R06.02 SHORTNESS OF BREATH: ICD-10-CM

## 2019-04-15 DIAGNOSIS — R07.9 CHEST PAIN: ICD-10-CM

## 2019-04-15 PROCEDURE — 71046 X-RAY EXAM CHEST 2 VIEWS: CPT

## 2019-09-10 ENCOUNTER — HOSPITAL ENCOUNTER (EMERGENCY)
Facility: HOSPITAL | Age: 64
Discharge: HOME | End: 2019-09-11
Attending: EMERGENCY MEDICINE
Payer: MEDICARE

## 2019-09-10 DIAGNOSIS — E83.42 HYPOMAGNESEMIA: ICD-10-CM

## 2019-09-10 DIAGNOSIS — I48.0 PAROXYSMAL ATRIAL FIBRILLATION (CMS/HCC): Primary | ICD-10-CM

## 2019-09-10 LAB
BASOPHILS # BLD: 0.05 K/UL (ref 0.01–0.1)
BASOPHILS NFR BLD: 0.7 %
DIFFERENTIAL METHOD BLD: NORMAL
EOSINOPHIL # BLD: 0.14 K/UL (ref 0.04–0.36)
EOSINOPHIL NFR BLD: 2 %
ERYTHROCYTE [DISTWIDTH] IN BLOOD BY AUTOMATED COUNT: 12.9 % (ref 11.7–14.4)
HCT VFR BLDCO AUTO: 36.5 %
HGB BLD-MCNC: 12.5 G/DL
IMM GRANULOCYTES # BLD AUTO: 0.02 K/UL (ref 0–0.08)
IMM GRANULOCYTES NFR BLD AUTO: 0.3 %
LYMPHOCYTES # BLD: 2.69 K/UL (ref 1.2–3.5)
LYMPHOCYTES NFR BLD: 39.2 %
MCH RBC QN AUTO: 30.6 PG (ref 28–33.2)
MCHC RBC AUTO-ENTMCNC: 34.2 G/DL (ref 32.2–35.5)
MCV RBC AUTO: 89.2 FL (ref 83–98)
MONOCYTES # BLD: 0.48 K/UL (ref 0.28–0.8)
MONOCYTES NFR BLD: 7 %
NEUTROPHILS # BLD: 3.49 K/UL (ref 1.7–7)
NEUTS SEG NFR BLD: 50.8 %
NRBC BLD-RTO: 0 %
PDW BLD AUTO: 10 FL (ref 9.4–12.3)
PLATELET # BLD AUTO: 214 K/UL
RBC # BLD AUTO: 4.09 M/UL (ref 3.93–5.22)
WBC # BLD AUTO: 6.87 K/UL

## 2019-09-10 PROCEDURE — 36415 COLL VENOUS BLD VENIPUNCTURE: CPT | Performed by: EMERGENCY MEDICINE

## 2019-09-10 PROCEDURE — 80053 COMPREHEN METABOLIC PANEL: CPT | Performed by: EMERGENCY MEDICINE

## 2019-09-10 PROCEDURE — 83735 ASSAY OF MAGNESIUM: CPT | Performed by: EMERGENCY MEDICINE

## 2019-09-10 PROCEDURE — 93005 ELECTROCARDIOGRAM TRACING: CPT | Performed by: EMERGENCY MEDICINE

## 2019-09-10 PROCEDURE — 85025 COMPLETE CBC W/AUTO DIFF WBC: CPT | Performed by: EMERGENCY MEDICINE

## 2019-09-10 PROCEDURE — 84484 ASSAY OF TROPONIN QUANT: CPT | Performed by: EMERGENCY MEDICINE

## 2019-09-10 PROCEDURE — 93005 ELECTROCARDIOGRAM TRACING: CPT

## 2019-09-10 PROCEDURE — 99283 EMERGENCY DEPT VISIT LOW MDM: CPT

## 2019-09-10 RX ORDER — NAPROXEN SODIUM 220 MG/1
81 TABLET, FILM COATED ORAL DAILY
COMMUNITY

## 2019-09-10 RX ORDER — DILTIAZEM HYDROCHLORIDE 5 MG/ML
20 INJECTION INTRAVENOUS ONCE
Status: DISCONTINUED | OUTPATIENT
Start: 2019-09-10 | End: 2019-09-11 | Stop reason: HOSPADM

## 2019-09-10 ASSESSMENT — ENCOUNTER SYMPTOMS
VOMITING: 0
RHINORRHEA: 0
SORE THROAT: 0
DIARRHEA: 0
NAUSEA: 0
PALPITATIONS: 1

## 2019-09-11 VITALS
WEIGHT: 220 LBS | BODY MASS INDEX: 35.36 KG/M2 | HEART RATE: 60 BPM | DIASTOLIC BLOOD PRESSURE: 72 MMHG | RESPIRATION RATE: 19 BRPM | OXYGEN SATURATION: 96 % | SYSTOLIC BLOOD PRESSURE: 120 MMHG | TEMPERATURE: 97.6 F | HEIGHT: 66 IN

## 2019-09-11 LAB
ALBUMIN SERPL-MCNC: 3.8 G/DL (ref 3.4–5)
ALP SERPL-CCNC: 76 IU/L (ref 35–126)
ALT SERPL-CCNC: 21 IU/L (ref 11–54)
ANION GAP SERPL CALC-SCNC: 9 MEQ/L (ref 3–15)
AST SERPL-CCNC: 24 IU/L (ref 15–41)
ATRIAL RATE: 286
ATRIAL RATE: 63
BILIRUB SERPL-MCNC: 0.8 MG/DL (ref 0.3–1.2)
BUN SERPL-MCNC: 24 MG/DL (ref 8–20)
CALCIUM SERPL-MCNC: 9.5 MG/DL (ref 8.9–10.3)
CHLORIDE SERPL-SCNC: 108 MEQ/L (ref 98–109)
CO2 SERPL-SCNC: 24 MEQ/L (ref 22–32)
CREAT SERPL-MCNC: 1 MG/DL
GFR SERPL CREATININE-BSD FRML MDRD: 55.6 ML/MIN/1.73M*2
GLUCOSE SERPL-MCNC: 114 MG/DL (ref 70–99)
MAGNESIUM SERPL-MCNC: 1.7 MG/DL (ref 1.8–2.5)
P AXIS: 55
POTASSIUM SERPL-SCNC: 3.9 MEQ/L (ref 3.6–5.1)
PR INTERVAL: 156
PROT SERPL-MCNC: 6.9 G/DL (ref 6–8.2)
QRS DURATION: 72
QRS DURATION: 80
QT INTERVAL: 338
QT INTERVAL: 406
QTC CALCULATION(BAZETT): 415
QTC CALCULATION(BAZETT): 521
R AXIS: -1
R AXIS: -1
SODIUM SERPL-SCNC: 141 MEQ/L (ref 136–144)
T WAVE AXIS: -23
T WAVE AXIS: 257
TROPONIN I SERPL-MCNC: <0.03 NG/ML
VENTRICULAR RATE: 143
VENTRICULAR RATE: 63

## 2019-09-11 PROCEDURE — 93010 ELECTROCARDIOGRAM REPORT: CPT | Mod: 76 | Performed by: INTERNAL MEDICINE

## 2019-09-11 PROCEDURE — 93005 ELECTROCARDIOGRAM TRACING: CPT | Performed by: EMERGENCY MEDICINE

## 2019-09-11 PROCEDURE — 63700000 HC SELF-ADMINISTRABLE DRUG: Performed by: EMERGENCY MEDICINE

## 2019-09-11 RX ORDER — LANOLIN ALCOHOL/MO/W.PET/CERES
400 CREAM (GRAM) TOPICAL ONCE
Status: COMPLETED | OUTPATIENT
Start: 2019-09-11 | End: 2019-09-11

## 2019-09-11 RX ADMIN — MAGNESIUM OXIDE TAB 400 MG (241.3 MG ELEMENTAL MG) 400 MG: 400 (241.3 MG) TAB at 00:53

## 2019-09-11 ASSESSMENT — ENCOUNTER SYMPTOMS
LOWER EXTREMITY EDEMA: 0
SHORTNESS OF BREATH: 0
CHEST PRESSURE: 0
FREQUENCY: 0

## 2019-09-11 NOTE — ED PROVIDER NOTES
HPI     Chief Complaint   Patient presents with   • Irregular Heart Beat         History provided by:  Patient  Palpitations   Palpitations quality:  Irregular  Onset quality:  Sudden  Duration:  2 hours (just pta)  Timing:  Constant  Progression:  Unchanged  Chronicity:  Recurrent  Context: not anxiety, not caffeine, not dehydration, not exercise, not nicotine and not stimulant use    Context comment:  Palpitation secondary to eating  Relieved by:  None tried  Worsened by:  Nothing  Ineffective treatments:  None tried  Associated symptoms: no chest pain, no chest pressure, no lower extremity edema, no nausea, no shortness of breath and no vomiting    Risk factors: heart disease and hx of atrial fibrillation    Risk factors: no OTC sinus medications    Risk factors comment:  Pt is on Xarelto       Patient History     Past Medical History:   Diagnosis Date   • Anxiety    • Arthritis    • Asthma    • H. pylori infection     treated with prev pac    • Heart palpitations    • Hypertension    • Lipid disorder    • Pericarditis     hospitalized 3-4 days, followed by Dr Mathur       Past Surgical History:   Procedure Laterality Date   •  SECTION      ,    • COLONOSCOPY     • ESOPHAGOGASTRODUODENOSCOPY     • JOINT REPLACEMENT Right     hip replacement   • LUNG SURGERY Left     pleural fluid   • SKIN BIOPSY      nose, BCC   • TONSILLECTOMY     • WISDOM TOOTH EXTRACTION         Family History   Problem Relation Age of Onset   • Hypertension Mother    • Heart disease Mother    • Heart disease Father    • Hyperlipidemia Father    • Hypertension Sister        Social History   Substance Use Topics   • Smoking status: Former Smoker     Quit date:    • Smokeless tobacco: Never Used   • Alcohol use Yes      Comment: occas       Systems Reviewed from Nursing Triage:          Review of Systems     Review of Systems   HENT: Negative for rhinorrhea and sore throat.    Respiratory: Negative for  "shortness of breath.    Cardiovascular: Positive for palpitations. Negative for chest pain.   Gastrointestinal: Negative for diarrhea, nausea and vomiting.   Genitourinary: Negative for frequency.        Physical Exam     ED Triage Vitals [09/10/19 2245]   Temp Heart Rate Resp BP SpO2   36.4 °C (97.6 °F) (!) 140 18 (!) 148/97 96 %      Temp Source Heart Rate Source Patient Position BP Location FiO2 (%) (Set)   Oral Monitor Sitting Left upper arm --       Pulse Ox %: 96 % (09/10/19 2252)  Pulse Ox Interpretation: Normal (09/10/19 2252)  Heart Rate: 140 (09/10/19 2252)  Rhythm Strip Interpretation: Sinus Tachycardia (09/10/19 2252)    Patient Vitals for the past 24 hrs:   BP Temp Temp src Pulse Resp SpO2 Height Weight   09/10/19 2245 (!) 148/97 36.4 °C (97.6 °F) Oral (!) 140 18 96 % - -   09/10/19 2242 - - - - - - 1.676 m (5' 6\") 99.8 kg (220 lb)           Physical Exam   Constitutional: She appears well-developed and well-nourished. No distress.   HENT:   Head: Normocephalic and atraumatic.   Eyes: Conjunctivae and EOM are normal.   Neck: Neck supple.   Cardiovascular: An irregularly irregular rhythm present. Tachycardia present.    No murmur heard.  Pulmonary/Chest: Effort normal and breath sounds normal. No respiratory distress.   Abdominal: Soft. There is no tenderness.   Musculoskeletal: Normal range of motion. She exhibits no edema.   Neurological: She is alert. GCS eye subscore is 4. GCS verbal subscore is 5. GCS motor subscore is 6.   Skin: Skin is warm and dry.   Psychiatric: She has a normal mood and affect.   Nursing note and vitals reviewed.           Procedures    ED Course & MDM     Labs Reviewed   CBC AND DIFFERENTIAL    Narrative:     The following orders were created for panel order CBC and differential.  Procedure                               Abnormality         Status                     ---------                               -----------         ------                     CBC[[id number]]             "                                                              Diff Count[[id number]]                                                                     Please view results for these tests on the individual orders.   COMPREHENSIVE METABOLIC PANEL   TROPONIN I   CBC   DIFF COUNT       ECG 12 lead    (Results Pending)               [facility]      Scribe Attestation  By signing my name below, I, Shaun Martínez, attest that this documentation has been prepared under the direction and in the presence of Dr. Magana.    9/10/2019 10:54 PM    IRosa, personally performed the services described in this documentation, as documented by the scribe in my presence, and it is both accurate and complete.  9/11/2019 1:29 AM        ED Course as of Sep 11 0129   Tue Sep 10, 2019   2325 Patient presents for evaluation in rapid A. fib.  She is unsure what triggered this episode.  She denies any caffeine consumption.  She is keeping herself well-hydrated.  And she is taking no over-the-counter medications that could induce palpitations.  She is been eating and drinking normally.  No recent illness.  Will check basic blood and start Cardizem.  Of note EKG shows rapid atrial fibrillation at a rate of 150 bpm.  Will reevaluate  [KR]   Wed Sep 11, 2019   0014 Patient converted to normal sinus rhythm.  Will repeat EKG.  Medication not needed.  Will replete magnesium and discharged to follow-up with her cardiologist.  [KR]   0040 Repeat EKG shows normal sinus rhythm at 63 bpm with normal axis and nonspecific T wave flattening.  [KR]      ED Course User Index  [KR] Rosa Magana MD         Clinical Impressions as of Sep 11 0129   Paroxysmal atrial fibrillation (CMS/HCC) (HCC)   Hypomagnesemia        Shaun Martínez  09/10/19 2327       Rosa Magana MD  09/11/19 0129

## 2019-10-02 ENCOUNTER — TRANSCRIBE ORDERS (OUTPATIENT)
Dept: SCHEDULING | Age: 64
End: 2019-10-02

## 2019-10-02 DIAGNOSIS — M17.12 UNILATERAL PRIMARY OSTEOARTHRITIS, LEFT KNEE: Primary | ICD-10-CM

## 2019-10-07 ENCOUNTER — HOSPITAL ENCOUNTER (OUTPATIENT)
Dept: RADIOLOGY | Facility: HOSPITAL | Age: 64
Discharge: HOME | End: 2019-10-07
Attending: ORTHOPAEDIC SURGERY
Payer: MEDICARE

## 2019-10-07 DIAGNOSIS — M17.12 UNILATERAL PRIMARY OSTEOARTHRITIS, LEFT KNEE: ICD-10-CM

## 2019-10-07 PROCEDURE — 73721 MRI JNT OF LWR EXTRE W/O DYE: CPT | Mod: LT

## 2020-05-15 ENCOUNTER — ANESTHESIA (OUTPATIENT)
Dept: ENDOSCOPY | Facility: HOSPITAL | Age: 65
End: 2020-05-15
Payer: MEDICARE

## 2020-05-15 ENCOUNTER — HOSPITAL ENCOUNTER (OUTPATIENT)
Facility: HOSPITAL | Age: 65
Discharge: HOME | End: 2020-05-15
Attending: INTERNAL MEDICINE | Admitting: INTERNAL MEDICINE
Payer: MEDICARE

## 2020-05-15 ENCOUNTER — ANESTHESIA EVENT (OUTPATIENT)
Dept: ENDOSCOPY | Facility: HOSPITAL | Age: 65
End: 2020-05-15
Payer: MEDICARE

## 2020-05-15 VITALS
TEMPERATURE: 97.5 F | DIASTOLIC BLOOD PRESSURE: 73 MMHG | WEIGHT: 230 LBS | HEIGHT: 66 IN | OXYGEN SATURATION: 98 % | SYSTOLIC BLOOD PRESSURE: 122 MMHG | BODY MASS INDEX: 36.96 KG/M2 | RESPIRATION RATE: 18 BRPM | HEART RATE: 64 BPM

## 2020-05-15 DIAGNOSIS — I48.0 PAROXYSMAL ATRIAL FIBRILLATION (CMS/HCC): Primary | ICD-10-CM

## 2020-05-15 PROCEDURE — 5A2204Z RESTORATION OF CARDIAC RHYTHM, SINGLE: ICD-10-PCS | Performed by: INTERNAL MEDICINE

## 2020-05-15 PROCEDURE — 63600000 HC DRUGS/DETAIL CODE: Mod: JW | Performed by: ANESTHESIOLOGY

## 2020-05-15 PROCEDURE — 93005 ELECTROCARDIOGRAM TRACING: CPT | Performed by: NURSE PRACTITIONER

## 2020-05-15 PROCEDURE — 92960 CARDIOVERSION ELECTRIC EXT: CPT | Performed by: INTERNAL MEDICINE

## 2020-05-15 PROCEDURE — 37000002 HC ANESTHESIA MAC: Performed by: INTERNAL MEDICINE

## 2020-05-15 RX ORDER — SPIRONOLACTONE 50 MG/1
50 TABLET, FILM COATED ORAL DAILY
COMMUNITY

## 2020-05-15 RX ORDER — PROPOFOL 200MG/20ML
SYRINGE (ML) INTRAVENOUS AS NEEDED
Status: DISCONTINUED | OUTPATIENT
Start: 2020-05-15 | End: 2020-05-15 | Stop reason: SURG

## 2020-05-15 RX ORDER — FLECAINIDE ACETATE 150 MG/1
150 TABLET ORAL ONCE
Qty: 1 TABLET | Refills: 0
Start: 2020-05-15 | End: 2020-05-15 | Stop reason: SDUPTHER

## 2020-05-15 RX ORDER — DILTIAZEM HYDROCHLORIDE 120 MG/1
120 CAPSULE, COATED, EXTENDED RELEASE ORAL DAILY
COMMUNITY

## 2020-05-15 RX ORDER — FLECAINIDE ACETATE 50 MG/1
200 TABLET ORAL ONCE
Status: ON HOLD | COMMUNITY
Start: 2020-05-14 | End: 2020-05-15 | Stop reason: SDUPTHER

## 2020-05-15 RX ORDER — FLECAINIDE ACETATE 100 MG/1
100 TABLET ORAL 2 TIMES DAILY
Qty: 30 TABLET | Refills: 1 | Status: SHIPPED | OUTPATIENT
Start: 2020-05-15 | End: 2025-02-17 | Stop reason: SDUPTHER

## 2020-05-15 RX ADMIN — PROPOFOL 100 MG: 10 INJECTION, EMULSION INTRAVENOUS at 13:58

## 2020-05-15 ASSESSMENT — PAIN SCALES - GENERAL: PAIN_LEVEL: 1

## 2020-05-15 NOTE — ANESTHESIA PREPROCEDURE EVALUATION
Relevant Problems   CARDIOVASCULAR   (+) CAD (coronary artery disease)   (+) Dyslipidemia   (+) Essential hypertension      MUSCULOSKELETAL   (+) Degenerative joint disease of left hip      RESPIRATORY SYSTEM   (+) CHUY (obstructive sleep apnea)       Anesthesia ROS/MED HX    Anesthesia History - neg  Pulmonary    asthma   Sleep apnea  Neuro/Psych - neg  Cardiovascular   CAD   hypertension  Hematological - neg  GI/Hepatic- neg  Musculoskeletal- neg  Renal Disease- neg  Endo/Other- neg       Past Surgical History:   Procedure Laterality Date   •  SECTION      ,    • COLONOSCOPY     • ESOPHAGOGASTRODUODENOSCOPY     • JOINT REPLACEMENT Right     hip replacement   • LUNG SURGERY Left     pleural fluid   • SKIN BIOPSY      nose, BCC   • TONSILLECTOMY     • WISDOM TOOTH EXTRACTION         Physical Exam    Airway   Mallampati: II   TM distance: >3 FB   Neck ROM: full  Cardiovascular - normal   Rhythm: regular   Rate: normalPulmonary - normal   clear to auscultation  Dental - normal        Anesthesia Plan    Plan: MAC    Technique: MAC     Lines and Monitors: PIV     Airway: natural airway / supplemental oxygen   ASA 3  Anesthetic plan and risks discussed with: patient

## 2020-05-15 NOTE — ANESTHESIOLOGIST PRE-PROCEDURE ATTESTATION
Pre-Procedure Patient Identification:  I am the Primary Anesthesiologist and have identified the patient on 05/15/20 at 1:25 PM.   I have confirmed the following procedure(s) CARDIOVERSION EXTERNAL will be performed by the following surgeon/proceduralist Claribel Miranda MD.

## 2020-05-15 NOTE — ANESTHESIA POSTPROCEDURE EVALUATION
Patient: Barbara Landeros    Procedure Summary     Date:  05/15/20 Room / Location:  [facility] GI 2 / [facility]    Anesthesia Start:  1355 Anesthesia Stop:  1403    Procedure:  CARDIOVERSION EXTERNAL (N/A ) Diagnosis:  (afib)    Provider:  Claribel Miranda MD Responsible Provider:  Ryan Hernández MD    Anesthesia Type:  MAC ASA Status:  3          Anesthesia Type: MAC  PACU Vitals     No data found in the last 10 encounters.            Anesthesia Post Evaluation    Pain score: 1  Pain management: satisfactory to patient  Mode of pain management: IV medication  Patient location during evaluation: PACU  Patient participation: complete - patient participated  Level of consciousness: awake  Cardiovascular status: acceptable  Airway Patency: adequate  Respiratory status: acceptable  Hydration status: stable  Anesthetic complications: no

## 2020-05-15 NOTE — DISCHARGE INSTRUCTIONS
You had a cardioversion on May 15, 2020 by Dr. Claribel Miranda MD    You have a follow up Telemedicine appointment on Friday June 5, 2020 at 9:30am with Dr. Claribel Miranda MD.    CARDIOVERSION DISCHARGE INSTRUCTIONS    Anesthesia:    You received a short-acting anesthesia during your procedure. Because of this, you may not drive or drink alcohol for 24 hours.    Medications:    Please review your medications with your care provider prior to leaving the hospital. In particular, if you take a blood thinner such as aspirin or Coumadin (warfarin), please be sure to understand the instructions regarding these medications.    How you may feel:    Most patients feel well after the procedure. However you may have discomfort in the chest or a skin burn where the electrical current was applied. This will resolve in a few days.If it is uncomfortable you may use 1% hydrocortisone cream or ibuprofen cream. (All are available over the counter)    Due to the anesthesia, you may feel drowsy for the rest of the day.    Activity:    Rest for the remainder of the day while you feel the effects from the anesthesia. You may resume your normal activity the following day.    Get help if symptoms occur:    • Rapid heartbeat or palpitations    • Dizziness    • Shortness of breath    • Passing out    • Numbness, or arm/leg weakness    • Difficulty with speech    Follow-up:    •Follow-up Appointment:    •Additional Instructions:

## 2020-05-16 LAB
ATRIAL RATE: 64
P AXIS: 36
PR INTERVAL: 182
QRS DURATION: 78
QT INTERVAL: 372
QTC CALCULATION(BAZETT): 383
R AXIS: -17
T WAVE AXIS: -2
VENTRICULAR RATE: 64

## 2020-09-24 ENCOUNTER — HOSPITAL ENCOUNTER (OUTPATIENT)
Dept: CARDIOLOGY | Facility: HOSPITAL | Age: 65
Discharge: HOME | End: 2020-09-24
Attending: ORTHOPAEDIC SURGERY
Payer: MEDICARE

## 2020-09-24 ENCOUNTER — APPOINTMENT (OUTPATIENT)
Dept: LAB | Facility: HOSPITAL | Age: 65
End: 2020-09-24
Attending: ORTHOPAEDIC SURGERY
Payer: MEDICARE

## 2020-09-24 ENCOUNTER — OFFICE VISIT (OUTPATIENT)
Dept: PREADMISSION TESTING | Facility: HOSPITAL | Age: 65
End: 2020-09-24
Attending: ORTHOPAEDIC SURGERY
Payer: MEDICARE

## 2020-09-24 ENCOUNTER — TRANSCRIBE ORDERS (OUTPATIENT)
Dept: LAB | Facility: HOSPITAL | Age: 65
End: 2020-09-24

## 2020-09-24 VITALS
SYSTOLIC BLOOD PRESSURE: 158 MMHG | HEIGHT: 66 IN | RESPIRATION RATE: 18 BRPM | TEMPERATURE: 97.3 F | WEIGHT: 230 LBS | BODY MASS INDEX: 36.96 KG/M2 | OXYGEN SATURATION: 96 % | HEART RATE: 62 BPM | DIASTOLIC BLOOD PRESSURE: 75 MMHG

## 2020-09-24 DIAGNOSIS — M17.12 UNILATERAL PRIMARY OSTEOARTHRITIS, LEFT KNEE: ICD-10-CM

## 2020-09-24 DIAGNOSIS — M17.12 UNILATERAL PRIMARY OSTEOARTHRITIS, LEFT KNEE: Primary | ICD-10-CM

## 2020-09-24 DIAGNOSIS — I10 ESSENTIAL HYPERTENSION: ICD-10-CM

## 2020-09-24 DIAGNOSIS — Z01.818 PREOP EXAMINATION: Primary | ICD-10-CM

## 2020-09-24 DIAGNOSIS — I48.0 PAROXYSMAL A-FIB (CMS/HCC): ICD-10-CM

## 2020-09-24 LAB
ABO + RH BLD: NORMAL
ANION GAP SERPL CALC-SCNC: 12 MEQ/L (ref 3–15)
BLD GP AB SCN SERPL QL: NEGATIVE
BLOOD BANK CMNT PATIENT-IMP: NORMAL
BUN SERPL-MCNC: 18 MG/DL (ref 8–20)
CALCIUM SERPL-MCNC: 9.8 MG/DL (ref 8.9–10.3)
CHLORIDE SERPL-SCNC: 105 MEQ/L (ref 98–109)
CO2 SERPL-SCNC: 24 MEQ/L (ref 22–32)
CREAT SERPL-MCNC: 1.3 MG/DL (ref 0.6–1.1)
D AG BLD QL: POSITIVE
ERYTHROCYTE [DISTWIDTH] IN BLOOD BY AUTOMATED COUNT: 13 % (ref 11.7–14.4)
GFR SERPL CREATININE-BSD FRML MDRD: 41 ML/MIN/1.73M*2
GLUCOSE SERPL-MCNC: 87 MG/DL (ref 70–99)
HCT VFR BLDCO AUTO: 37.1 % (ref 35–45)
HGB BLD-MCNC: 12.1 G/DL (ref 11.8–15.7)
LABORATORY COMMENT REPORT: NORMAL
MCH RBC QN AUTO: 30.8 PG (ref 28–33.2)
MCHC RBC AUTO-ENTMCNC: 32.6 G/DL (ref 32.2–35.5)
MCV RBC AUTO: 94.4 FL (ref 83–98)
PDW BLD AUTO: 10.4 FL (ref 9.4–12.3)
PLATELET # BLD AUTO: 233 K/UL (ref 150–369)
POTASSIUM SERPL-SCNC: 4.5 MEQ/L (ref 3.6–5.1)
RBC # BLD AUTO: 3.93 M/UL (ref 3.93–5.22)
SODIUM SERPL-SCNC: 141 MEQ/L (ref 136–144)
WBC # BLD AUTO: 7.92 K/UL (ref 3.8–10.5)

## 2020-09-24 PROCEDURE — 99999 PR OFFICE/OUTPT VISIT,PROCEDURE ONLY: CPT | Performed by: INTERNAL MEDICINE

## 2020-09-24 PROCEDURE — 82310 ASSAY OF CALCIUM: CPT

## 2020-09-24 PROCEDURE — 85027 COMPLETE CBC AUTOMATED: CPT | Mod: GA

## 2020-09-24 PROCEDURE — 99214 OFFICE O/P EST MOD 30 MIN: CPT | Performed by: INTERNAL MEDICINE

## 2020-09-24 PROCEDURE — 36415 COLL VENOUS BLD VENIPUNCTURE: CPT

## 2020-09-24 PROCEDURE — U0003 INFECTIOUS AGENT DETECTION BY NUCLEIC ACID (DNA OR RNA); SEVERE ACUTE RESPIRATORY SYNDROME CORONAVIRUS 2 (SARS-COV-2) (CORONAVIRUS DISEASE [COVID-19]), AMPLIFIED PROBE TECHNIQUE, MAKING USE OF HIGH THROUGHPUT TECHNOLOGIES AS DESCRIBED BY CMS-2020-01-R: HCPCS | Performed by: INTERNAL MEDICINE

## 2020-09-24 PROCEDURE — 86901 BLOOD TYPING SEROLOGIC RH(D): CPT

## 2020-09-24 RX ORDER — ACETAMINOPHEN 500 MG
500 TABLET ORAL DAILY
COMMUNITY
Start: 2013-10-21

## 2020-09-24 RX ORDER — ALBUTEROL SULFATE 90 UG/1
INHALANT RESPIRATORY (INHALATION) AS NEEDED
COMMUNITY
End: 2024-10-02 | Stop reason: SDUPTHER

## 2020-09-24 ASSESSMENT — PAIN SCALES - GENERAL: PAINLEVEL: 8

## 2020-09-24 NOTE — PRE-PROCEDURE INSTRUCTIONS
1. We will call you between 3 pm and 7 pm on September 28, 2020 to determine that arrival time for your procedure. If you do not hear by 6PM. Please call [phone] for arrival time.    2. Please report to Main Entrance near Parking lot A, walk into main lobby and report to the admission desk on the first floor on the day of your procedure.       3. Please follow the following fasting guidelines:     No solids for EIGHT HOURS prior to surgery.  A light meal, meaning plain toast ONLY, is permitted for up to SIX HOURS prior to surgery.  Unlimited clear liquids, meaning water or PLAIN black coffee WITHOUT any milk or cream, are permitted up to TWO HOURS prior to arrival at the hospital.     4. Early on the morning of the procedure please take your usual dose of the listed medications dilTIAZem CD (CARDIZEM CD) ,sertraline (ZOLOFT) , flecainide (TAMBOCOR) with a sip of water:    PER ANESTHESIOLOGIST, DO NOT TAKE spironolactone (ALDACTONE)  ON THE MORNING OF SURGERY    PER ANESTHESIOLOGIST, DO NOT TAKE losartan (COZAAR) ON THE NIGHT BEFORE YOUR SURGERY    PLEASE FOLLOW INSTRUCTION FROM DR BURKETT REGARDING ASPIRIN, rivaroxaban (XARELTO, HERBAL SUPPLEMENTS, ADVIL, ALEVE, MOTRIN, BODY WASH.     5. Other Instructions: You may brush your teeth the morning of the procedure. Rinse and spit, do not swallow.  Bring a list of your medications with dosages with you.  Use surgical wash as directed.    6. If you develop a cold, cough, fever, rash, or other symptom prior to the data of the procedure, please report it to your physician immediately.   7. If you need to cancel the procedure for any reason, please contact your physician or call the unit listed above.   8. Make arrangements to have someone drive you home from the procedure. If you have not arranged for transportation home, your surgery may be cancelled.    9. You may not take public transportation unless accompanied by a responsible person.   10. You may not drive a  car or operate complex or potentially dangerous machinery for 24 hours following anesthesia and/or sedation.   11. If it is medically necessary for you to have a longer stay, you will be informed as soon as the decision is made.   12. Do not wear or bring anything of value to the hospital including jewelry of any kind, money, or wallet. Do not wear make-up or contact lenses. DO bring your glasses and hearing aid. DO NOT BRING MEDICATIONS FROM HOME.   13. No lotion, creams, powders, or oils on skin the morning of procedure    14. Dress in comfortable clothes.   15.  If instructed, please bring a copy of your Advanced Directive (Living Will/Durable Power of ) on the day of your procedure.      Pre operative instructions given as per protocol.  Form explained by: NIKHIL Olson     I have read and understand the above information. I have had sufficient opportunity to ask questions I might have and they have been answered to my satisfaction. I agree to comply with the Patient Responsibilities listed above and have received a copy of this form.

## 2020-09-24 NOTE — H&P (VIEW-ONLY)
[facility] Medicine Service -  Pre-Operative Consultation       Patient Name: Barbara Landeros  Referring Surgeon: Rafi    Reason for Referral: Pre-Operative Evaluation  Surgical Procedure: L TKA  Operative Date: 9/29/2020  Other Providers:      PCP: Tenzin Luevano Sr., DO     Cardiology Patsy     HISTORY OF PRESENT ILLNESS      Barbara Landeros is a 64 y.o. female presenting today to the [facility] at [facility] for pre-operative evaluation prior to planned surgery.    Has been battling with knee pain for years has received an injection previously.  Of note she previously has had both hips done initially in 2014 had her left hip done in around 2017 had a right hip arthroplasty.  Unfortunately recently she has had to slow down her activities of daily living and no longer goes walking for exercise or pleasure as the pain has become in excess.  She feels as though that she is gained weight due to her inability to walk and move around which is made her arthritis even worse.  She initially wanted her surgery done in June however her  succumbed to cancer of his tongue and she has been helping to care for him since.  She finds caring for him to be difficult when she has to go around the house.      In regards to medical history:  HTN  Afib  Pericarditis 2011  PNA with effusion requiring thoracotomy  Asthma not on any inhalers    The patient denies any current or recent chest pain or pressure, dyspnea, cough, sputum, fevers, chills, abdominal pain, nausea, vomiting, diarrhea or other symptoms.     Functionally, the patient is able to ascend a flight or so of stairs with no dyspnea or chest pain.     The patient denies, on specific questioning, the following:  No history of MI, or CHF.  No history of CHUY. Tested twice and negative both times  No history of DVT/PE.  No history of COPD.  No history of CVA.  No history of DM.   No history of CKD.     PAST MEDICAL AND  SURGICAL HISTORY      Past Medical History:   Diagnosis Date   • A-fib (CMS/HCC)    • Anxiety    • Arthritis    • Asthma    • H. pylori infection     treated with prev pac    • Heart palpitations    • Hypertension    • Lipid disorder    • Pericarditis     hospitalized 3-4 days, followed by Dr Mathur       Past Surgical History:   Procedure Laterality Date   • CARDIOVERSION  2020   •  SECTION      ,    • COLONOSCOPY     • ESOPHAGOGASTRODUODENOSCOPY     • JOINT REPLACEMENT Right 2014    hip replacement   • JOINT REPLACEMENT Left     hip replacement   • LUNG SURGERY Left     pleural fluid   • SKIN BIOPSY      nose, BCC   • TONSILLECTOMY     • WISDOM TOOTH EXTRACTION         MEDICATIONS        Current Outpatient Medications:   •  acetaminophen (TYLENOL EXTRA STRENGTH) 500 mg tablet, Take 500 mg by mouth daily., Disp: , Rfl:   •  albuterol HFA (VENTOLIN HFA) 90 mcg/actuation inhaler, Inhale as needed., Disp: , Rfl:   •  aspirin 81 mg chewable tablet, Take 81 mg by mouth daily., Disp: , Rfl:   •  atorvastatin (LIPITOR) 40 mg tablet, Take 40 mg by mouth every evening.  , Disp: , Rfl:   •  dilTIAZem CD (CARDIZEM CD) 120 mg 24 hr capsule, Take 120 mg by mouth daily., Disp: , Rfl:   •  flecainide (TAMBOCOR) 100 mg tablet, Take 1 tablet (100 mg total) by mouth 2 (two) times a day for 30 doses., Disp: 30 tablet, Rfl: 1  •  losartan (COZAAR) 50 mg tablet, Take 50 mg by mouth nightly.  , Disp: , Rfl:   •  rivaroxaban (XARELTO) 20 mg tablet, Take 20 mg by mouth daily with dinner., Disp: , Rfl:   •  sertraline (ZOLOFT) 50 mg tablet, Take 50 mg by mouth every morning.  , Disp: , Rfl:   •  spironolactone (ALDACTONE) 50 mg tablet, Take 50 mg by mouth daily., Disp: , Rfl:     ALLERGIES      Bystolic [nebivolol] and Prevpac [[id number]-cibuvdmnz]    FAMILY HISTORY      family history includes Heart disease in her biological father and biological mother; Hyperlipidemia in her biological father;  "Hypertension in her biological mother, biological sister, and biological sister.    Denies any prior known family history of DVTs/PEs/clotting disorder    SOCIAL HISTORY      Social History     Tobacco Use   • Smoking status: Former Smoker     Quit date:      Years since quittin.7   • Smokeless tobacco: Never Used   Substance Use Topics   • Alcohol use: Not Currently     Comment: occas   • Drug use: No       REVIEW OF SYSTEMS      All other systems reviewed and negative except as noted in HPI    PHYSICAL EXAMINATION      Visit Vitals  BP (!) 158/75 (BP Location: Left upper arm, Patient Position: Sitting)   Pulse 62   Temp 36.3 °C (97.3 °F) (Temporal)   Resp 18   Ht 1.676 m (5' 6\")   Wt 104 kg (230 lb)   SpO2 96% Comment: Rm air   BMI 37.12 kg/m²     Body mass index is 37.12 kg/m².    Physical Exam  Vitals signs reviewed.   Constitutional:       Appearance: Normal appearance. She is obese.   HENT:      Head: Normocephalic and atraumatic.      Nose: Nose normal.      Mouth/Throat:      Mouth: Mucous membranes are moist.   Eyes:      Extraocular Movements: Extraocular movements intact.      Pupils: Pupils are equal, round, and reactive to light.   Neck:      Musculoskeletal: Normal range of motion and neck supple.   Cardiovascular:      Rate and Rhythm: Normal rate and regular rhythm.      Pulses: Normal pulses.      Heart sounds: Normal heart sounds. No murmur.   Pulmonary:      Effort: Pulmonary effort is normal.      Breath sounds: Normal breath sounds.   Abdominal:      General: Abdomen is flat.      Palpations: Abdomen is soft.   Musculoskeletal:         General: Swelling: trace swelling.      Comments: Limited range of motion and difficulty with flexion and extension of the left side.  Clicks are felt with range of motion.   Neurological:      Mental Status: She is alert.         LABS / EKG        Labs  I have reviewed the patient's pertinent labs.  Significant abnormals are mild elevated in Cr " 1.3.    Lab Results   Component Value Date     09/24/2020    K 4.5 09/24/2020     09/24/2020    BUN 18 09/24/2020    CREATININE 1.3 (H) 09/24/2020    WBC 7.92 09/24/2020    HGB 12.1 09/24/2020    HCT 37.1 09/24/2020     09/24/2020    ALT 21 09/10/2019    AST 24 09/10/2019         ECG/Telemetry  I have independently reviewed the ECG. No significant findings.    ASSESSMENT AND PLAN         Essential hypertension  Timing of resuming home med regimen will depend on BP, volume status, renal function in the post op period.  Pt will hold losartan and spironolactone prior to surgery, diltiazem will be continued    Paroxysmal A-fib (CMS/[facility])  Cont diltiazem and flecainide, AC to be held per recs of Dr Garner, her appointment was eariler today, please restart this once okay from a surgical perspective      CAD (coronary artery disease)  Seen and evaluated by Dr Garner earlier today, stable        In regards to perioperative cardiac risk:  The patient denies any history of ischemic heart disease, denies any history of CHF, denies any history of CVA, is not on pre-operative treatment with insulin, and does not have a pre-operative creatinine > 2 mg/dL.  Seen by Dr Garner I will defer to him for cardiac evaluation.       Further comments:  Resume supplements when OK with surgical team.  I would encourage incentive spirometry to assist with minimizing peace-operative pulmonary risk.  DVT prophylaxis and timing of such per the discretion of the surgeon.     Please do not hesitate to [place] [facility] [place] upcoming [place] with any questions or concerns.     Ferdinand Russo MD  9/25/2020

## 2020-09-24 NOTE — CONSULTS
[facility] Medicine Service -  Pre-Operative Consultation       Patient Name: Barbara Landeros  Referring Surgeon: Rafi    Reason for Referral: Pre-Operative Evaluation  Surgical Procedure: L TKA  Operative Date: 9/29/2020  Other Providers:      PCP: Tenzin Luevano Sr., DO     Cardiology Patsy     HISTORY OF PRESENT ILLNESS      Barbara Landeros is a 64 y.o. female presenting today to the [facility] at [facility] for pre-operative evaluation prior to planned surgery.    Has been battling with knee pain for years has received an injection previously.  Of note she previously has had both hips done initially in 2014 had her left hip done in around 2017 had a right hip arthroplasty.  Unfortunately recently she has had to slow down her activities of daily living and no longer goes walking for exercise or pleasure as the pain has become in excess.  She feels as though that she is gained weight due to her inability to walk and move around which is made her arthritis even worse.  She initially wanted her surgery done in June however her  succumbed to cancer of his tongue and she has been helping to care for him since.  She finds caring for him to be difficult when she has to go around the house.      In regards to medical history:  HTN  Afib  Pericarditis 2011  PNA with effusion requiring thoracotomy  Asthma not on any inhalers    The patient denies any current or recent chest pain or pressure, dyspnea, cough, sputum, fevers, chills, abdominal pain, nausea, vomiting, diarrhea or other symptoms.     Functionally, the patient is able to ascend a flight or so of stairs with no dyspnea or chest pain.     The patient denies, on specific questioning, the following:  No history of MI, or CHF.  No history of CHUY. Tested twice and negative both times  No history of DVT/PE.  No history of COPD.  No history of CVA.  No history of DM.   No history of CKD.     PAST MEDICAL AND  SURGICAL HISTORY      Past Medical History:   Diagnosis Date   • A-fib (CMS/HCC)    • Anxiety    • Arthritis    • Asthma    • H. pylori infection     treated with prev pac    • Heart palpitations    • Hypertension    • Lipid disorder    • Pericarditis     hospitalized 3-4 days, followed by Dr Mathur       Past Surgical History:   Procedure Laterality Date   • CARDIOVERSION  2020   •  SECTION      ,    • COLONOSCOPY     • ESOPHAGOGASTRODUODENOSCOPY     • JOINT REPLACEMENT Right 2014    hip replacement   • JOINT REPLACEMENT Left     hip replacement   • LUNG SURGERY Left     pleural fluid   • SKIN BIOPSY      nose, BCC   • TONSILLECTOMY     • WISDOM TOOTH EXTRACTION         MEDICATIONS        Current Outpatient Medications:   •  acetaminophen (TYLENOL EXTRA STRENGTH) 500 mg tablet, Take 500 mg by mouth daily., Disp: , Rfl:   •  albuterol HFA (VENTOLIN HFA) 90 mcg/actuation inhaler, Inhale as needed., Disp: , Rfl:   •  aspirin 81 mg chewable tablet, Take 81 mg by mouth daily., Disp: , Rfl:   •  atorvastatin (LIPITOR) 40 mg tablet, Take 40 mg by mouth every evening.  , Disp: , Rfl:   •  dilTIAZem CD (CARDIZEM CD) 120 mg 24 hr capsule, Take 120 mg by mouth daily., Disp: , Rfl:   •  flecainide (TAMBOCOR) 100 mg tablet, Take 1 tablet (100 mg total) by mouth 2 (two) times a day for 30 doses., Disp: 30 tablet, Rfl: 1  •  losartan (COZAAR) 50 mg tablet, Take 50 mg by mouth nightly.  , Disp: , Rfl:   •  rivaroxaban (XARELTO) 20 mg tablet, Take 20 mg by mouth daily with dinner., Disp: , Rfl:   •  sertraline (ZOLOFT) 50 mg tablet, Take 50 mg by mouth every morning.  , Disp: , Rfl:   •  spironolactone (ALDACTONE) 50 mg tablet, Take 50 mg by mouth daily., Disp: , Rfl:     ALLERGIES      Bystolic [nebivolol] and Prevpac [btathqcf-[id number]-ileteguel]    FAMILY HISTORY      family history includes Heart disease in her biological father and biological mother; Hyperlipidemia in her biological father;  "Hypertension in her biological mother, biological sister, and biological sister.    Denies any prior known family history of DVTs/PEs/clotting disorder    SOCIAL HISTORY      Social History     Tobacco Use   • Smoking status: Former Smoker     Quit date:      Years since quittin.7   • Smokeless tobacco: Never Used   Substance Use Topics   • Alcohol use: Not Currently     Comment: occas   • Drug use: No       REVIEW OF SYSTEMS      All other systems reviewed and negative except as noted in HPI    PHYSICAL EXAMINATION      Visit Vitals  BP (!) 158/75 (BP Location: Left upper arm, Patient Position: Sitting)   Pulse 62   Temp 36.3 °C (97.3 °F) (Temporal)   Resp 18   Ht 1.676 m (5' 6\")   Wt 104 kg (230 lb)   SpO2 96% Comment: Rm air   BMI 37.12 kg/m²     Body mass index is 37.12 kg/m².    Physical Exam  Vitals signs reviewed.   Constitutional:       Appearance: Normal appearance. She is obese.   HENT:      Head: Normocephalic and atraumatic.      Nose: Nose normal.      Mouth/Throat:      Mouth: Mucous membranes are moist.   Eyes:      Extraocular Movements: Extraocular movements intact.      Pupils: Pupils are equal, round, and reactive to light.   Neck:      Musculoskeletal: Normal range of motion and neck supple.   Cardiovascular:      Rate and Rhythm: Normal rate and regular rhythm.      Pulses: Normal pulses.      Heart sounds: Normal heart sounds. No murmur.   Pulmonary:      Effort: Pulmonary effort is normal.      Breath sounds: Normal breath sounds.   Abdominal:      General: Abdomen is flat.      Palpations: Abdomen is soft.   Musculoskeletal:         General: Swelling: trace swelling.      Comments: Limited range of motion and difficulty with flexion and extension of the left side.  Clicks are felt with range of motion.   Neurological:      Mental Status: She is alert.         LABS / EKG        Labs  I have reviewed the patient's pertinent labs.  Significant abnormals are mild elevated in Cr " 1.3.    Lab Results   Component Value Date     09/24/2020    K 4.5 09/24/2020     09/24/2020    BUN 18 09/24/2020    CREATININE 1.3 (H) 09/24/2020    WBC 7.92 09/24/2020    HGB 12.1 09/24/2020    HCT 37.1 09/24/2020     09/24/2020    ALT 21 09/10/2019    AST 24 09/10/2019         ECG/Telemetry  I have independently reviewed the ECG. No significant findings.    ASSESSMENT AND PLAN         Essential hypertension  Timing of resuming home med regimen will depend on BP, volume status, renal function in the post op period.  Pt will hold losartan and spironolactone prior to surgery, diltiazem will be continued    Paroxysmal A-fib (CMS/[facility])  Cont diltiazem and flecainide, AC to be held per recs of Dr Garner, her appointment was eariler today, please restart this once okay from a surgical perspective      CAD (coronary artery disease)  Seen and evaluated by Dr Garner earlier today, stable        In regards to perioperative cardiac risk:  The patient denies any history of ischemic heart disease, denies any history of CHF, denies any history of CVA, is not on pre-operative treatment with insulin, and does not have a pre-operative creatinine > 2 mg/dL.  Seen by Dr Garner I will defer to him for cardiac evaluation.       Further comments:  Resume supplements when OK with surgical team.  I would encourage incentive spirometry to assist with minimizing peace-operative pulmonary risk.  DVT prophylaxis and timing of such per the discretion of the surgeon.     Please do not hesitate to [place] [facility] [place] with any questions or concerns.     Ferdinand Russo MD  9/25/2020

## 2020-09-25 PROBLEM — I48.0 PAROXYSMAL A-FIB (CMS/HCC): Status: ACTIVE | Noted: 2020-09-25

## 2020-09-25 LAB — SARS-COV-2 RNA RESP QL NAA+PROBE: NOT DETECTED

## 2020-09-25 NOTE — ASSESSMENT & PLAN NOTE
Cont diltiazem and flecainide, AC to be held per recs of Dr Garner, her appointment was eariler today, please restart this once okay from a surgical perspective

## 2020-09-25 NOTE — ASSESSMENT & PLAN NOTE
Timing of resuming home med regimen will depend on BP, volume status, renal function in the post op period.  Pt will hold losartan and spironolactone prior to surgery, diltiazem will be continued

## 2020-09-28 ENCOUNTER — ANESTHESIA EVENT (OUTPATIENT)
Dept: OPERATING ROOM | Facility: HOSPITAL | Age: 65
Setting detail: SURGERY ADMIT
DRG: 470 | End: 2020-09-28
Payer: MEDICARE

## 2020-09-28 ASSESSMENT — ENCOUNTER SYMPTOMS: DYSRHYTHMIAS: 1

## 2020-09-28 NOTE — ANESTHESIA PREPROCEDURE EVALUATION
Relevant Problems   CARDIOVASCULAR   (+) CAD (coronary artery disease)   (+) Dyslipidemia   (+) Essential hypertension   (+) Paroxysmal A-fib (CMS/HCC)      MUSCULOSKELETAL   (+) Degenerative joint disease of left hip      RESPIRATORY SYSTEM   (+) CHUY (obstructive sleep apnea)       Anesthesia ROS/MED HX    Anesthesia History    Previous anesthetics  Pulmonary    asthma   Sleep apnea  Neuro/Psych    Anxiety  Cardiovascular   CAD   dyslipidemia   hypertension  Dysrhythmias and atrial fibrillation   Cardiac clearance reviewed, stress test reviewed, Covid19 Test Reviewed and ECG reviewed  Comments: Denies loose  Hematological    anticoagulants (off x 3d)  GI/Hepatic- neg  Musculoskeletal   Arthritis  Renal Disease- neg  Endo/Other- neg  Body Habitus: Obese       Past Surgical History:   Procedure Laterality Date   • CARDIOVERSION  2020   •  SECTION      ,    • COLONOSCOPY     • ESOPHAGOGASTRODUODENOSCOPY     • JOINT REPLACEMENT Right     hip replacement   • JOINT REPLACEMENT Left     hip replacement   • LUNG SURGERY Left     pleural fluid   • SKIN BIOPSY      nose, BCC   • TONSILLECTOMY     • WISDOM TOOTH EXTRACTION         Physical Exam    Airway   Mallampati: III   TM distance: >3 FB   Neck ROM: full  Cardiovascular - normal   Rhythm: regular   Rate: normalPulmonary - normal   clear to auscultation  Other Findings   Denies loose  Dental    Teeth Problems: missing        Anesthesia Plan    Plan: spinal    Technique: spinal   ASA 3  Anesthetic plan and risks discussed with: patient       Wt Readings from Last 3 Encounters:   20 104 kg (230 lb)   05/15/20 104 kg (230 lb)   10/07/19 99.3 kg (219 lb)       Temp Readings from Last 3 Encounters:   20 36.3 °C (97.3 °F) (Temporal)   05/15/20 36.4 °C (97.5 °F) (Temporal)   09/10/19 36.4 °C (97.6 °F) (Oral)       BP Readings from Last 3 Encounters:   20 (!) 158/75   05/15/20 122/73   19 120/72       Pulse Readings from Last  3 Encounters:   09/24/20 62   05/15/20 64   09/11/19 60       Lab Results   Component Value Date    WBC 7.92 09/24/2020    HGB 12.1 09/24/2020    HCT 37.1 09/24/2020    MCV 94.4 09/24/2020     09/24/2020       Lab Results   Component Value Date    GLUCOSE 87 09/24/2020    CALCIUM 9.8 09/24/2020     09/24/2020    K 4.5 09/24/2020    CO2 24 09/24/2020     09/24/2020    BUN 18 09/24/2020    CREATININE 1.3 (H) 09/24/2020       No results found for: HCGPREGUR, PREGSERUM, HCG, HCGQUANT          No current facility-administered medications for this encounter.      Current Outpatient Medications   Medication Sig Dispense Refill   • acetaminophen (TYLENOL EXTRA STRENGTH) 500 mg tablet Take 500 mg by mouth daily.     • albuterol HFA (VENTOLIN HFA) 90 mcg/actuation inhaler Inhale as needed.     • aspirin 81 mg chewable tablet Take 81 mg by mouth daily.     • atorvastatin (LIPITOR) 40 mg tablet Take 40 mg by mouth every evening.       • dilTIAZem CD (CARDIZEM CD) 120 mg 24 hr capsule Take 120 mg by mouth daily.     • flecainide (TAMBOCOR) 100 mg tablet Take 1 tablet (100 mg total) by mouth 2 (two) times a day for 30 doses. 30 tablet 1   • losartan (COZAAR) 50 mg tablet Take 50 mg by mouth nightly.       • rivaroxaban (XARELTO) 20 mg tablet Take 20 mg by mouth daily with dinner.     • sertraline (ZOLOFT) 50 mg tablet Take 50 mg by mouth every morning.       • spironolactone (ALDACTONE) 50 mg tablet Take 50 mg by mouth daily.         Prior to Admission medications    Medication Sig Start Date End Date Taking? Authorizing Provider   acetaminophen (TYLENOL EXTRA STRENGTH) 500 mg tablet Take 500 mg by mouth daily. 10/21/13   Rancho Kelsey MD   albuterol HFA (VENTOLIN HFA) 90 mcg/actuation inhaler Inhale as needed.    Rancho Kelsey MD   aspirin 81 mg chewable tablet Take 81 mg by mouth daily.    Rancho Kelsey MD   atorvastatin (LIPITOR) 40 mg tablet Take 40 mg by mouth every evening.       Rancho Kelsey MD   dilTIAZem CD (CARDIZEM CD) 120 mg 24 hr capsule Take 120 mg by mouth daily.    Rancho Kelsey MD   flecainide (TAMBOCOR) 100 mg tablet Take 1 tablet (100 mg total) by mouth 2 (two) times a day for 30 doses. 5/15/20 5/30/20  Stacey Barnett CRNP   losartan (COZAAR) 50 mg tablet Take 50 mg by mouth nightly.   10/21/13   Rancho Kelsey MD   rivaroxaban (XARELTO) 20 mg tablet Take 20 mg by mouth daily with dinner.    Rancho Kelsey MD   sertraline (ZOLOFT) 50 mg tablet Take 50 mg by mouth every morning.   8/10/11   Rancho Kelsey MD   spironolactone (ALDACTONE) 50 mg tablet Take 50 mg by mouth daily.    Rancho Kelsey MD       Patient Active Problem List   Diagnosis   • Essential hypertension   • Dyslipidemia   • CAD (coronary artery disease)   • Pericarditis   • CHUY (obstructive sleep apnea)   • Degenerative joint disease of left hip   • Paroxysmal A-fib (CMS/HCC)       Past Medical History:   Diagnosis Date   • A-fib (CMS/HCC)    • Anxiety    • Arthritis    • Asthma    • H. pylori infection     treated with prev pac    • Heart palpitations    • Hypertension    • Lipid disorder    • Pericarditis     hospitalized 3-4 days, followed by Dr Mathur       Past Surgical History:   Procedure Laterality Date   • CARDIOVERSION  2020   •  SECTION      ,    • COLONOSCOPY     • ESOPHAGOGASTRODUODENOSCOPY     • JOINT REPLACEMENT Right     hip replacement   • JOINT REPLACEMENT Left     hip replacement   • LUNG SURGERY Left     pleural fluid   • SKIN BIOPSY      nose, BCC   • TONSILLECTOMY     • WISDOM TOOTH EXTRACTION

## 2020-09-28 NOTE — ANESTHESIOLOGIST PRE-PROCEDURE CHART REVIEW
I confirm that I have reviewed the available information in the medical record prior to the scheduled surgery.

## 2020-09-29 ENCOUNTER — APPOINTMENT (INPATIENT)
Dept: RADIOLOGY | Facility: HOSPITAL | Age: 65
DRG: 470 | End: 2020-09-29
Attending: PHYSICIAN ASSISTANT
Payer: MEDICARE

## 2020-09-29 ENCOUNTER — ANESTHESIA (OUTPATIENT)
Dept: OPERATING ROOM | Facility: HOSPITAL | Age: 65
Setting detail: SURGERY ADMIT
DRG: 470 | End: 2020-09-29
Payer: MEDICARE

## 2020-09-29 ENCOUNTER — HOSPITAL ENCOUNTER (INPATIENT)
Facility: HOSPITAL | Age: 65
LOS: 1 days | Discharge: HOME | DRG: 470 | End: 2020-09-30
Attending: ORTHOPAEDIC SURGERY | Admitting: ORTHOPAEDIC SURGERY
Payer: MEDICARE

## 2020-09-29 PROBLEM — M17.12 LEFT KNEE DJD: Status: ACTIVE | Noted: 2020-09-29

## 2020-09-29 PROCEDURE — 12000000 HC ROOM AND CARE MED/SURG

## 2020-09-29 PROCEDURE — C1776 JOINT DEVICE (IMPLANTABLE): HCPCS | Performed by: ORTHOPAEDIC SURGERY

## 2020-09-29 PROCEDURE — 63600000 HC DRUGS/DETAIL CODE: Performed by: PHYSICIAN ASSISTANT

## 2020-09-29 PROCEDURE — 25000000 HC PHARMACY GENERAL: Performed by: ANESTHESIOLOGY

## 2020-09-29 PROCEDURE — 97162 PT EVAL MOD COMPLEX 30 MIN: CPT | Mod: GP

## 2020-09-29 PROCEDURE — 71000001 HC PACU PHASE 1 INITIAL 30MIN: Performed by: ORTHOPAEDIC SURGERY

## 2020-09-29 PROCEDURE — 36000016 HC OR LEVEL 6 EA ADDL MIN: Performed by: ORTHOPAEDIC SURGERY

## 2020-09-29 PROCEDURE — 36000006 HC OR LEVEL 6 INITIAL 30MIN: Performed by: ORTHOPAEDIC SURGERY

## 2020-09-29 PROCEDURE — 63700000 HC SELF-ADMINISTRABLE DRUG: Performed by: PHYSICIAN ASSISTANT

## 2020-09-29 PROCEDURE — 25800000 HC PHARMACY IV SOLUTIONS: Performed by: NURSE ANESTHETIST, CERTIFIED REGISTERED

## 2020-09-29 PROCEDURE — 25000000 HC PHARMACY GENERAL: Performed by: PHYSICIAN ASSISTANT

## 2020-09-29 PROCEDURE — 37000010 HC ANESTHESIA SPINAL: Performed by: ORTHOPAEDIC SURGERY

## 2020-09-29 PROCEDURE — 21400000 HC ROOM AND CARE CCU/INTERMEDIATE

## 2020-09-29 PROCEDURE — 27200000 HC STERILE SUPPLY: Performed by: ORTHOPAEDIC SURGERY

## 2020-09-29 PROCEDURE — 73560 X-RAY EXAM OF KNEE 1 OR 2: CPT | Mod: LT

## 2020-09-29 PROCEDURE — 71000011 HC PACU PHASE 1 EA ADDL MIN: Performed by: ORTHOPAEDIC SURGERY

## 2020-09-29 PROCEDURE — 25000000 HC PHARMACY GENERAL: Performed by: NURSE ANESTHETIST, CERTIFIED REGISTERED

## 2020-09-29 PROCEDURE — 25000000 HC PHARMACY GENERAL: Performed by: ORTHOPAEDIC SURGERY

## 2020-09-29 PROCEDURE — 97530 THERAPEUTIC ACTIVITIES: CPT | Mod: GP

## 2020-09-29 PROCEDURE — 25800000 HC PHARMACY IV SOLUTIONS: Performed by: PHYSICIAN ASSISTANT

## 2020-09-29 PROCEDURE — 0SRD0J9 REPLACEMENT OF LEFT KNEE JOINT WITH SYNTHETIC SUBSTITUTE, CEMENTED, OPEN APPROACH: ICD-10-PCS | Performed by: ORTHOPAEDIC SURGERY

## 2020-09-29 PROCEDURE — C1713 ANCHOR/SCREW BN/BN,TIS/BN: HCPCS | Performed by: ORTHOPAEDIC SURGERY

## 2020-09-29 PROCEDURE — 63600000 HC DRUGS/DETAIL CODE: Performed by: NURSE ANESTHETIST, CERTIFIED REGISTERED

## 2020-09-29 DEVICE — CEMENT BONE PALACOS RADIOPAQUE: Type: IMPLANTABLE DEVICE | Site: KNEE | Status: FUNCTIONAL

## 2020-09-29 DEVICE — TIBIA COMPONENT SZ 3: Type: IMPLANTABLE DEVICE | Site: KNEE | Status: FUNCTIONAL

## 2020-09-29 DEVICE — FEMORAL COMPONENT SZ E LEFT: Type: IMPLANTABLE DEVICE | Site: KNEE | Status: FUNCTIONAL

## 2020-09-29 DEVICE — PATELLA COMPONENT 32MM: Type: IMPLANTABLE DEVICE | Site: KNEE | Status: FUNCTIONAL

## 2020-09-29 DEVICE — IMPLANTABLE DEVICE: Type: IMPLANTABLE DEVICE | Site: KNEE | Status: FUNCTIONAL

## 2020-09-29 RX ORDER — METOCLOPRAMIDE HYDROCHLORIDE 5 MG/ML
INJECTION INTRAMUSCULAR; INTRAVENOUS AS NEEDED
Status: DISCONTINUED | OUTPATIENT
Start: 2020-09-29 | End: 2020-09-30 | Stop reason: SURG

## 2020-09-29 RX ORDER — DEXAMETHASONE SODIUM PHOSPHATE 4 MG/ML
INJECTION, SOLUTION INTRA-ARTICULAR; INTRALESIONAL; INTRAMUSCULAR; INTRAVENOUS; SOFT TISSUE AS NEEDED
Status: DISCONTINUED | OUTPATIENT
Start: 2020-09-29 | End: 2020-09-30 | Stop reason: SURG

## 2020-09-29 RX ORDER — MIDAZOLAM HYDROCHLORIDE 2 MG/2ML
INJECTION, SOLUTION INTRAMUSCULAR; INTRAVENOUS AS NEEDED
Status: DISCONTINUED | OUTPATIENT
Start: 2020-09-29 | End: 2020-09-30 | Stop reason: SURG

## 2020-09-29 RX ORDER — PROPOFOL 10 MG/ML
INJECTION, EMULSION INTRAVENOUS AS NEEDED
Status: DISCONTINUED | OUTPATIENT
Start: 2020-09-29 | End: 2020-09-30 | Stop reason: SURG

## 2020-09-29 RX ORDER — POLYETHYLENE GLYCOL 3350 17 G/17G
17 POWDER, FOR SOLUTION ORAL DAILY PRN
Status: DISCONTINUED | OUTPATIENT
Start: 2020-09-29 | End: 2020-09-30 | Stop reason: HOSPADM

## 2020-09-29 RX ORDER — SODIUM CHLORIDE, SODIUM LACTATE, POTASSIUM CHLORIDE, CALCIUM CHLORIDE 600; 310; 30; 20 MG/100ML; MG/100ML; MG/100ML; MG/100ML
INJECTION, SOLUTION INTRAVENOUS CONTINUOUS
Status: DISCONTINUED | OUTPATIENT
Start: 2020-09-29 | End: 2020-09-30

## 2020-09-29 RX ORDER — ALUMINUM HYDROXIDE, MAGNESIUM HYDROXIDE, AND SIMETHICONE 1200; 120; 1200 MG/30ML; MG/30ML; MG/30ML
30 SUSPENSION ORAL EVERY 4 HOURS PRN
Status: DISCONTINUED | OUTPATIENT
Start: 2020-09-29 | End: 2020-09-30 | Stop reason: HOSPADM

## 2020-09-29 RX ORDER — CEFAZOLIN SODIUM 2 G/50ML
2 SOLUTION INTRAVENOUS
Status: COMPLETED | OUTPATIENT
Start: 2020-09-29 | End: 2020-09-29

## 2020-09-29 RX ORDER — AMOXICILLIN 250 MG
1 CAPSULE ORAL DAILY PRN
Status: DISCONTINUED | OUTPATIENT
Start: 2020-09-29 | End: 2020-09-30 | Stop reason: HOSPADM

## 2020-09-29 RX ORDER — FAMOTIDINE 10 MG/ML
INJECTION INTRAVENOUS AS NEEDED
Status: DISCONTINUED | OUTPATIENT
Start: 2020-09-29 | End: 2020-09-29 | Stop reason: HOSPADM

## 2020-09-29 RX ORDER — ONDANSETRON HYDROCHLORIDE 2 MG/ML
4 INJECTION, SOLUTION INTRAVENOUS EVERY 8 HOURS PRN
Status: DISCONTINUED | OUTPATIENT
Start: 2020-09-29 | End: 2020-09-30 | Stop reason: HOSPADM

## 2020-09-29 RX ORDER — DIPHENHYDRAMINE HCL 50 MG/ML
25 VIAL (ML) INJECTION EVERY 6 HOURS PRN
Status: DISCONTINUED | OUTPATIENT
Start: 2020-09-29 | End: 2020-09-30 | Stop reason: HOSPADM

## 2020-09-29 RX ORDER — ONDANSETRON HYDROCHLORIDE 2 MG/ML
4 INJECTION, SOLUTION INTRAVENOUS
Status: DISCONTINUED | OUTPATIENT
Start: 2020-09-29 | End: 2020-09-29 | Stop reason: HOSPADM

## 2020-09-29 RX ORDER — ONDANSETRON 4 MG/1
4 TABLET, ORALLY DISINTEGRATING ORAL EVERY 8 HOURS PRN
Status: DISCONTINUED | OUTPATIENT
Start: 2020-09-29 | End: 2020-09-30 | Stop reason: HOSPADM

## 2020-09-29 RX ORDER — OXYCODONE HYDROCHLORIDE 5 MG/1
5 TABLET ORAL EVERY 4 HOURS PRN
Status: DISCONTINUED | OUTPATIENT
Start: 2020-09-29 | End: 2020-09-30

## 2020-09-29 RX ORDER — SODIUM CHLORIDE 9 MG/ML
INJECTION, SOLUTION INTRAVENOUS CONTINUOUS PRN
Status: DISCONTINUED | OUTPATIENT
Start: 2020-09-29 | End: 2020-09-30 | Stop reason: SURG

## 2020-09-29 RX ORDER — GABAPENTIN 300 MG/1
300 CAPSULE ORAL ONCE
Status: COMPLETED | OUTPATIENT
Start: 2020-09-29 | End: 2020-09-29

## 2020-09-29 RX ORDER — SERTRALINE HYDROCHLORIDE 50 MG/1
50 TABLET, FILM COATED ORAL EVERY MORNING
Status: DISCONTINUED | OUTPATIENT
Start: 2020-09-30 | End: 2020-09-30 | Stop reason: HOSPADM

## 2020-09-29 RX ORDER — FLECAINIDE ACETATE 100 MG/1
100 TABLET ORAL 2 TIMES DAILY
Status: DISCONTINUED | OUTPATIENT
Start: 2020-09-29 | End: 2020-09-30 | Stop reason: HOSPADM

## 2020-09-29 RX ORDER — AMOXICILLIN 250 MG
1 CAPSULE ORAL 2 TIMES DAILY
Status: DISCONTINUED | OUTPATIENT
Start: 2020-09-29 | End: 2020-09-30 | Stop reason: HOSPADM

## 2020-09-29 RX ORDER — MEPERIDINE HYDROCHLORIDE 50 MG/ML
12.5 INJECTION INTRAMUSCULAR; INTRAVENOUS; SUBCUTANEOUS EVERY 10 MIN PRN
Status: DISCONTINUED | OUTPATIENT
Start: 2020-09-29 | End: 2020-09-29 | Stop reason: HOSPADM

## 2020-09-29 RX ORDER — IBUPROFEN 200 MG
16-32 TABLET ORAL AS NEEDED
Status: DISCONTINUED | OUTPATIENT
Start: 2020-09-29 | End: 2020-09-30 | Stop reason: HOSPADM

## 2020-09-29 RX ORDER — DILTIAZEM HYDROCHLORIDE 120 MG/1
120 CAPSULE, COATED, EXTENDED RELEASE ORAL DAILY
Status: DISCONTINUED | OUTPATIENT
Start: 2020-09-29 | End: 2020-09-30 | Stop reason: HOSPADM

## 2020-09-29 RX ORDER — HYDROMORPHONE HYDROCHLORIDE 1 MG/ML
0.5 INJECTION, SOLUTION INTRAMUSCULAR; INTRAVENOUS; SUBCUTANEOUS
Status: DISCONTINUED | OUTPATIENT
Start: 2020-09-29 | End: 2020-09-29 | Stop reason: HOSPADM

## 2020-09-29 RX ORDER — TRAMADOL HYDROCHLORIDE 50 MG/1
50 TABLET ORAL EVERY 6 HOURS PRN
Status: DISCONTINUED | OUTPATIENT
Start: 2020-09-29 | End: 2020-09-30 | Stop reason: HOSPADM

## 2020-09-29 RX ORDER — ENOXAPARIN SODIUM 100 MG/ML
30 INJECTION SUBCUTANEOUS
Status: DISCONTINUED | OUTPATIENT
Start: 2020-09-30 | End: 2020-09-30 | Stop reason: HOSPADM

## 2020-09-29 RX ORDER — ATORVASTATIN CALCIUM 40 MG/1
40 TABLET, FILM COATED ORAL EVERY EVENING
Status: DISCONTINUED | OUTPATIENT
Start: 2020-09-29 | End: 2020-09-30 | Stop reason: HOSPADM

## 2020-09-29 RX ORDER — BUPIVACAINE HYDROCHLORIDE 7.5 MG/ML
INJECTION, SOLUTION INTRASPINAL
Status: COMPLETED | OUTPATIENT
Start: 2020-09-29 | End: 2020-09-29

## 2020-09-29 RX ORDER — POLYETHYLENE GLYCOL 3350 17 G/17G
17 POWDER, FOR SOLUTION ORAL DAILY
Status: DISCONTINUED | OUTPATIENT
Start: 2020-09-29 | End: 2020-09-30 | Stop reason: HOSPADM

## 2020-09-29 RX ORDER — ONDANSETRON HYDROCHLORIDE 2 MG/ML
INJECTION, SOLUTION INTRAVENOUS AS NEEDED
Status: DISCONTINUED | OUTPATIENT
Start: 2020-09-29 | End: 2020-09-30 | Stop reason: SURG

## 2020-09-29 RX ORDER — HYDROMORPHONE HYDROCHLORIDE 1 MG/ML
0.5 INJECTION, SOLUTION INTRAMUSCULAR; INTRAVENOUS; SUBCUTANEOUS
Status: DISCONTINUED | OUTPATIENT
Start: 2020-09-29 | End: 2020-09-30 | Stop reason: HOSPADM

## 2020-09-29 RX ORDER — SPIRONOLACTONE 25 MG/1
50 TABLET ORAL DAILY
Status: DISCONTINUED | OUTPATIENT
Start: 2020-09-30 | End: 2020-09-30 | Stop reason: HOSPADM

## 2020-09-29 RX ORDER — BUPIVACAINE HYDROCHLORIDE AND EPINEPHRINE 5; 5 MG/ML; UG/ML
60 INJECTION, SOLUTION EPIDURAL; INTRACAUDAL; PERINEURAL ONCE
Status: COMPLETED | OUTPATIENT
Start: 2020-09-29 | End: 2020-09-29

## 2020-09-29 RX ORDER — ONDANSETRON 4 MG/1
4 TABLET, ORALLY DISINTEGRATING ORAL
Status: DISCONTINUED | OUTPATIENT
Start: 2020-09-30 | End: 2020-09-30 | Stop reason: HOSPADM

## 2020-09-29 RX ORDER — DEXTROSE 40 %
15-30 GEL (GRAM) ORAL AS NEEDED
Status: DISCONTINUED | OUTPATIENT
Start: 2020-09-29 | End: 2020-09-30 | Stop reason: HOSPADM

## 2020-09-29 RX ORDER — ACETAMINOPHEN 325 MG/1
975 TABLET ORAL EVERY 8 HOURS
Status: DISCONTINUED | OUTPATIENT
Start: 2020-09-29 | End: 2020-09-30

## 2020-09-29 RX ORDER — CEFAZOLIN SODIUM 2 G/50ML
2 SOLUTION INTRAVENOUS
Status: COMPLETED | OUTPATIENT
Start: 2020-09-29 | End: 2020-09-30

## 2020-09-29 RX ORDER — LOSARTAN POTASSIUM 50 MG/1
50 TABLET ORAL DAILY
Status: DISCONTINUED | OUTPATIENT
Start: 2020-09-30 | End: 2020-09-30 | Stop reason: HOSPADM

## 2020-09-29 RX ORDER — DEXTROSE 50 % IN WATER (D50W) INTRAVENOUS SYRINGE
25 AS NEEDED
Status: DISCONTINUED | OUTPATIENT
Start: 2020-09-29 | End: 2020-09-30 | Stop reason: HOSPADM

## 2020-09-29 RX ORDER — BISACODYL 10 MG/1
10 SUPPOSITORY RECTAL DAILY PRN
Status: DISCONTINUED | OUTPATIENT
Start: 2020-09-29 | End: 2020-09-30 | Stop reason: HOSPADM

## 2020-09-29 RX ORDER — FENTANYL CITRATE 50 UG/ML
50 INJECTION, SOLUTION INTRAMUSCULAR; INTRAVENOUS
Status: DISCONTINUED | OUTPATIENT
Start: 2020-09-29 | End: 2020-09-29 | Stop reason: HOSPADM

## 2020-09-29 RX ORDER — LIDOCAINE HYDROCHLORIDE 10 MG/ML
INJECTION, SOLUTION INFILTRATION; PERINEURAL AS NEEDED
Status: DISCONTINUED | OUTPATIENT
Start: 2020-09-29 | End: 2020-09-30 | Stop reason: SURG

## 2020-09-29 RX ORDER — PROPOFOL 10 MG/ML
INJECTION, EMULSION INTRAVENOUS CONTINUOUS PRN
Status: DISCONTINUED | OUTPATIENT
Start: 2020-09-29 | End: 2020-09-30 | Stop reason: SURG

## 2020-09-29 RX ORDER — OXYCODONE HYDROCHLORIDE 5 MG/1
10 TABLET ORAL EVERY 4 HOURS PRN
Status: DISCONTINUED | OUTPATIENT
Start: 2020-09-29 | End: 2020-09-30

## 2020-09-29 RX ORDER — FAMOTIDINE 10 MG/ML
INJECTION INTRAVENOUS AS NEEDED
Status: DISCONTINUED | OUTPATIENT
Start: 2020-09-29 | End: 2020-09-30 | Stop reason: SURG

## 2020-09-29 RX ORDER — ACETAMINOPHEN 325 MG/1
975 TABLET ORAL ONCE
Status: COMPLETED | OUTPATIENT
Start: 2020-09-29 | End: 2020-09-29

## 2020-09-29 RX ORDER — GABAPENTIN 100 MG/1
100 CAPSULE ORAL 2 TIMES DAILY
Status: DISCONTINUED | OUTPATIENT
Start: 2020-09-29 | End: 2020-09-30 | Stop reason: HOSPADM

## 2020-09-29 RX ORDER — DIPHENHYDRAMINE HCL 25 MG
25 CAPSULE ORAL EVERY 6 HOURS PRN
Status: DISCONTINUED | OUTPATIENT
Start: 2020-09-29 | End: 2020-09-30 | Stop reason: HOSPADM

## 2020-09-29 RX ORDER — DEXAMETHASONE SODIUM PHOSPHATE 4 MG/ML
8 INJECTION, SOLUTION INTRA-ARTICULAR; INTRALESIONAL; INTRAMUSCULAR; INTRAVENOUS; SOFT TISSUE ONCE
Status: ACTIVE | OUTPATIENT
Start: 2020-09-30 | End: 2020-09-30

## 2020-09-29 RX ORDER — VANCOMYCIN/0.9 % SOD CHLORIDE 1.5G/250ML
1.5 PLASTIC BAG, INJECTION (ML) INTRAVENOUS
Status: COMPLETED | OUTPATIENT
Start: 2020-09-29 | End: 2020-09-29

## 2020-09-29 RX ADMIN — ACETAMINOPHEN 975 MG: 325 TABLET, FILM COATED ORAL at 14:55

## 2020-09-29 RX ADMIN — TRAMADOL HYDROCHLORIDE 50 MG: 50 TABLET, COATED ORAL at 17:00

## 2020-09-29 RX ADMIN — CEFAZOLIN SODIUM 2 G: 2 SOLUTION INTRAVENOUS at 18:56

## 2020-09-29 RX ADMIN — OXYCODONE HYDROCHLORIDE 5 MG: 5 TABLET ORAL at 20:19

## 2020-09-29 RX ADMIN — GABAPENTIN 100 MG: 100 CAPSULE ORAL at 20:18

## 2020-09-29 RX ADMIN — ONDANSETRON HYDROCHLORIDE 4 MG: 2 SOLUTION INTRAMUSCULAR; INTRAVENOUS at 12:33

## 2020-09-29 RX ADMIN — ATORVASTATIN CALCIUM 40 MG: 40 TABLET, FILM COATED ORAL at 18:56

## 2020-09-29 RX ADMIN — ACETAMINOPHEN 975 MG: 325 TABLET, FILM COATED ORAL at 08:02

## 2020-09-29 RX ADMIN — GABAPENTIN 300 MG: 300 CAPSULE ORAL at 08:02

## 2020-09-29 RX ADMIN — SODIUM CHLORIDE: 9 INJECTION, SOLUTION INTRAVENOUS at 10:24

## 2020-09-29 RX ADMIN — METOCLOPRAMIDE 10 MG: 5 INJECTION, SOLUTION INTRAMUSCULAR; INTRAVENOUS at 10:43

## 2020-09-29 RX ADMIN — LIDOCAINE HYDROCHLORIDE 5 ML: 10 INJECTION, SOLUTION INFILTRATION; PERINEURAL at 10:44

## 2020-09-29 RX ADMIN — CEFAZOLIN 2 G: 330 INJECTION, POWDER, FOR SOLUTION INTRAMUSCULAR; INTRAVENOUS at 10:33

## 2020-09-29 RX ADMIN — DOCUSATE SODIUM AND SENNOSIDES 1 TABLET: 8.6; 5 TABLET, FILM COATED ORAL at 20:19

## 2020-09-29 RX ADMIN — PROPOFOL INJECTABLE EMULSION 3 MG: 10 INJECTION, EMULSION INTRAVENOUS at 11:54

## 2020-09-29 RX ADMIN — DEXAMETHASONE SODIUM PHOSPHATE 6 MG: 4 INJECTION, SOLUTION INTRAMUSCULAR; INTRAVENOUS at 10:43

## 2020-09-29 RX ADMIN — ACETAMINOPHEN 975 MG: 325 TABLET, FILM COATED ORAL at 21:12

## 2020-09-29 RX ADMIN — PROPOFOL INJECTABLE EMULSION 50 MCG/KG/MIN: 10 INJECTION, EMULSION INTRAVENOUS at 10:39

## 2020-09-29 RX ADMIN — TRANEXAMIC ACID 2100 MG: 100 INJECTION, SOLUTION INTRAVENOUS at 11:56

## 2020-09-29 RX ADMIN — POVIDONE-IODINE 1 EACH: 5 SOLUTION TOPICAL at 08:03

## 2020-09-29 RX ADMIN — FLECAINIDE ACETATE 100 MG: 100 TABLET ORAL at 21:12

## 2020-09-29 RX ADMIN — PROPOFOL INJECTABLE EMULSION 40 MG: 10 INJECTION, EMULSION INTRAVENOUS at 10:38

## 2020-09-29 RX ADMIN — Medication 1500 MG: at 08:34

## 2020-09-29 RX ADMIN — MIDAZOLAM HYDROCHLORIDE 2 MG: 1 INJECTION, SOLUTION INTRAMUSCULAR; INTRAVENOUS at 10:25

## 2020-09-29 RX ADMIN — SODIUM CHLORIDE, SODIUM LACTATE, POTASSIUM CHLORIDE, CALCIUM CHLORIDE: 600; 310; 30; 20 INJECTION, SOLUTION INTRAVENOUS at 14:24

## 2020-09-29 RX ADMIN — BUPIVACAINE HYDROCHLORIDE IN DEXTROSE 2 ML: 7.5 INJECTION, SOLUTION SUBARACHNOID at 10:28

## 2020-09-29 RX ADMIN — FAMOTIDINE 20 MG: 10 INJECTION, SOLUTION INTRAVENOUS at 10:43

## 2020-09-29 ASSESSMENT — COGNITIVE AND FUNCTIONAL STATUS - GENERAL
WALKING IN HOSPITAL ROOM: 2 - A LOT
CLIMB 3 TO 5 STEPS WITH RAILING: 2 - A LOT
AFFECT: WFL
STANDING UP FROM CHAIR USING ARMS: 2 - A LOT
MOVING TO AND FROM BED TO CHAIR: 2 - A LOT

## 2020-09-29 NOTE — PROGRESS NOTES
Patient: Barbara Landeros  Location: [facility] Operating Room OR  MRN: [id number]  Today's date: 9/29/2020    Attempted to see patient for therapy in PACU. Unable due to medical hold(pt with continued numbness of L foot/toes).   Will await return of sensation prior to PT eval. Will check back with pt as able and as appropriate.

## 2020-09-29 NOTE — OR SURGEON
Pre-Procedure patient identification:  I am the primary operating surgeon/proceduralist and I have identified the patient on 09/29/20 at 9:56 AM Sandeep Mckee MD  Phone Number: [phone]

## 2020-09-29 NOTE — PROGRESS NOTES
"Patient: Barbara Landeros  Location: [facility] Operating Room OR  MRN: [id number]  Today's date: 9/29/2020    Pt supine in bed at conclusion of session with RN present in PACU. Pt in NAD.     Barbara is a 64 y.o. female admitted on 9/29/2020 with Left knee DJD.     Past Medical History  Barbara has a past medical history of A-fib (CMS/HCC), Anxiety, Arthritis, Asthma, H. pylori infection (2015), Heart palpitations, Hypertension, Lipid disorder, and Pericarditis (2010).    History of Present Illness   9/29 -> L TKA    PT Vitals    Date/Time Pulse HR Source Resp SpO2 Pt Activity O2 Therapy O2 Del Method O2 Flow Rate BP MAP Pt Position [facility]   09/29/20 1544 60 -- -- 98 % -- Supplemental oxygen Nasal cannula 2 L/min 138/70 -- Lying [facility]   09/29/20 1547 -- -- -- 95 % At rest None (Room air) -- -- -- -- -- [facility]   09/29/20 1600 72 Monitor 17 98 % -- None (Room air) -- -- 140/74 104 mmHg -- JM      PT Pain    Date/Time Pain Type Pref Pain Scale Side Orientation Location Rating: Rest [facility]   09/29/20 1544 Pain Assessment number (Numeric Rating Pain Scale) Left incisional knee 2 [facility]   09/29/20 1600 Pain Assessment number (Numeric Rating Pain Scale) -- -- -- 0 JM          Prior Living Environment      Most Recent Value   Living Environment Comment  [place] [facility] [place]  can enter to \"basement level\" by walking up steep driveway,  or can enter main level of home with 3+9+7+1 LATRICE with HR,  bedroom/bathroom on highest level,  tub shower with GB and SC          Prior Level of Function      Most Recent Value   Ambulation  independent   Transferring  independent   Toileting  independent   Bathing  independent   Dressing  independent   Eating  independent   Communication  understands/communicates without difficulty   Prior Level of Function Comment  pt reports independence with mobility PTA,  pt drives   Assistive Device/Animal Currently Used at Home  shower chair, grab bar, other (see comments) [owns multiple RW but " doesn't use]          PT Evaluation and Treatment - 09/29/20 1538        Time Calculation    Start Time  1538     Stop Time  1614     Time Calculation (min)  36 min        Session Details    Document Type  initial evaluation     Mode of Treatment  physical therapy        General Information    Patient Profile Reviewed?  yes     General Observations of Patient  received in PACU     Existing Precautions/Restrictions  fall;weight bearing        Cognition/Psychosocial    Affect/Mental Status (Cognitive)  WFL     Orientation Status (Cognition)  oriented x 4     Follows Commands (Cognition)  WFL        Sensory Assessment (Somatosensory)    Sensory Assessment (Somatosensory)  LE sensation intact;other (see comments)    intact to light touch B/L feet, ankles, lower leg    Sensory Subjective Reports  numbness;tingling;other (see comments)    posterior thighs once seated EOB        Range of Motion (ROM)    Range of Motion  left lower extremity ROM deficit;ROM is WFL;right lower extremity     Left Lower Extremity (ROM)  knee     Knee, Left (ROM)  extension 3 deg from 0 in supine with pillow under ankle; seated EOB knee flex AROM 60 deg        Strength (Manual Muscle Testing)    Strength (Manual Muscle Testing)  strength is WFL;bilateral lower extremities        Strength Comprehensive (MMT)    Comment  ankle DF 5/5; ankle PF 5/5 L and 4/5 R; at least 3/5 quads and hip flexors        Bed Mobility    [place], Supine to Sit  minimum assist (75% or more patient effort);1 person assist     [place], Sit to Supine  minimum assist (75% or more patient effort);2 person assist     Assistive Device (Bed Mobility)  head of bed elevated     Comment (Bed Mobility)  assist at LLE to advance to EOB; good trunk control; denies dizziness once upright         Sit to Stand Transfer    [place], Sit to Stand Transfer  moderate assist (50-74% patient effort);1 person assist     Assistive Device  walker, front-wheeled     Comment  from  EOB x4 trials; partial stands (~50% performed) with modA for balance; pt unsteady 2/2 reports of numbness in posterior legs; performed multiple reps while RN was changing sheets/chucks 2/2 urinary incontinence         Gait Training    Comment  unsafe to attempt         Safety Issues, Functional Mobility    Impairments Affecting Function (Mobility)  balance;pain;endurance/activity tolerance        Balance    Balance Assessment  sitting static balance;sitting dynamic balance     Static Sitting Balance  WFL;unsupported;sitting, edge of bed     Dynamic Sitting Balance  moderate impairment;supported     Comment, Balance  tolerated sitting EOB for 8-10 min total in session; able to perform knee extension and hip flex AROM        AM-PAC (TM) - Mobility (Current Function)    Turning from your back to your side while in a flat bed without using bedrails?  3 - A Little     Moving from lying on your back to sitting on the side of a flat bed without using bedrails?  3 - A Little     Moving to and from a bed to a chair?  2 - A Lot     Standing up from a chair using your arms?  2 - A Lot     To walk in a hospital room?  2 - A Lot     Climbing 3-5 steps with a railing?  2 - A Lot     AM-PAC (TM) Mobility Score  14        Therapy Assessment/Plan (PT)    Rehab Potential (PT)  good, to achieve stated therapy goals     Therapy Frequency (PT)  5-7 times/wk        Progress Summary (PT)    Daily Outcome Statement (PT)  PT eval complete; pt POD#0 (L) TKA; pt with intact sensation to light touch in feet and LEs supine and good motor function; minAx1 for sup-> sit; however once sitting EOB pt with urinary incontinence and reporting numbness of posterior thighs; peformed multiple partial STS with modAx1; pt unsteady; unsafe to progress mobility further 2/2 numbness; anticipate good progress with mobility during hospital stay; will continue to follow for functional mobility training; rec home at d/c w/ assist prn         Therapy Plan  Review/Discharge Plan (PT)    PT Recommended Discharge Disposition  home with assist     Anticipated Equipment Needs at Discharge (PT Eval)  none;other (see comments)    pt owns three rolling walkers                      Education provided this session. See the Patient Education summary report for full details.    PT Goals      Most Recent Value   Bed Mobility Goal 1   Activity/Assistive Device  bed mobility activities, all at 09/29/2020 1538   [place]  independent at 09/29/2020 1538   Time Frame  by discharge at 09/29/2020 1538   Progress/Outcome  goal ongoing at 09/29/2020 1538   Transfer Goal 1   Activity/Assistive Device  all transfers, walker, front-wheeled at 09/29/2020 1538   [place]  modified independence at 09/29/2020 1538   Time Frame  by discharge at 09/29/2020 1538   Progress/Outcome  goal ongoing at 09/29/2020 1538   Gait Training Goal 1   Activity/Assistive Device  gait (walking locomotion), walker, front-wheeled at 09/29/2020 1538   [place]  modified independence at 09/29/2020 1538   Distance  150 at 09/29/2020 1538   Time Frame  by discharge at 09/29/2020 1538   Progress/Outcome  goal ongoing at 09/29/2020 1538   Stairs Goal 1   Activity/Assistive Device  stairs, all skills, step-to-step, using handrail, right at 09/29/2020 1538   [place]  modified independence at 09/29/2020 1538   Number of Stairs  12 at 09/29/2020 1538   Time Frame  by discharge at 09/29/2020 1538   Progress/Outcome  goal ongoing at 09/29/2020 1538

## 2020-09-29 NOTE — PLAN OF CARE
Problem: Adult Inpatient Plan of Care  Goal: Plan of Care Review  Outcome: Progressing  Flowsheets (Taken 9/29/2020 6140)  Progress: improving  Plan of Care Reviewed With: patient  Outcome Summary:   PT eval complete   pt able to move extremities against gravity and again resistance in bed   however once EOB pt reporting numbness of posteior thighs   incontinent of bladder   performed multiple partial STS with modAx1 with RW

## 2020-09-29 NOTE — HOSPITAL COURSE
Barbara is a 64 y.o. female admitted on 9/29/2020 with Left knee DJD.     Past Medical History  Barbara has a past medical history of A-fib (CMS/HCC), Anxiety, Arthritis, Asthma, H. pylori infection (2015), Heart palpitations, Hypertension, Lipid disorder, and Pericarditis (2010).    History of Present Illness   9/29 -> L TKA

## 2020-09-29 NOTE — ANESTHESIOLOGIST PRE-PROCEDURE ATTESTATION
Pre-Procedure Patient Identification:  I am the Primary Anesthesiologist and have identified the patient on 09/29/20 at 8:41 AM.   I have confirmed the following procedure(s) LEFT TOTAL KNEE ARTHROPLASTY (L) will be performed by the following surgeon/proceduralist Sandeep Mckee MD.

## 2020-09-29 NOTE — BRIEF OP NOTE
LEFT TOTAL KNEE ARTHROPLASTY (L) Procedure Note    Procedure:    LEFT TOTAL KNEE ARTHROPLASTY  CPT(R) Code:  35943 - TX TOTAL KNEE ARTHROPLASTY      Pre-op Diagnosis     * Osteoarthritis of left knee, unspecified osteoarthritis type [M17.12]       Post-op Diagnosis     * Osteoarthritis of left knee, unspecified osteoarthritis type [M17.12]    Surgeon(s) and Role:     * Sandeep Mckee MD - Primary     * Carlos Isaac DO - Resident - Assisting    Anesthesia: Spinal    Staff:   Circulator: Phyllis Church RN  Physician Assistant: Odessa Piedra PA C  Scrub Person: Keagan Herbert; Neda Dobson    Procedure Details   See operative report    Estimated Blood Loss: 150 mL    Specimens:                Order Name Source Comment Collection Info Order Time   REPEAT ABO/RH (TYPE CHECK) Blood, Venous   9/29/2020  7:41 AM         Drains: * No LDAs found *    Implants:   Implant Name Type Inv. Item Serial No.  Lot No. LRB No. Used Action   CEMENT BONE PALACOS RADIOPAQUE - [id number]  CEMENT BONE PALACOS RADIOPAQUE  [employer] [id number] Left 2 Implanted   FEMORAL COMPONENT SZ E LEFT - [id number] Femoral knee component FEMORAL COMPONENT SZ E LEFT  [employer] [id number] Left 1 Implanted   TIBIA COMPONENT SZ 3 - [id number] Tibial baseplate TIBIA COMPONENT SZ 3  [employer] [id number] Left 1 Implanted   PATELLA COMPONENT 32MM - [id number] Patellar implant PATELLA COMPONENT 32MM  [employer] [id number] Left 1 Implanted   SURFACE ART NEXGEN LPS FLEX FIXED NSM EF 3-4 12MM - [id number] Tibial insert SURFACE ART NEXGEN LPS FLEX FIXED NSM EF 3-4 12MM  [employer] [id number] Left 1 [place]   SURFACE ARTICULAR NEXGEN FLEX EF 14MM - [id number] Tibial insert SURFACE ARTICULAR NEXGEN FLEX EF 14MM  [employer] [id number] Left 1 Implanted              Complications:  None; patient tolerated the procedure well.           Disposition: PACU - hemodynamically stable.           Condition: stable    Sandeep Mckee  MD  Phone Number: [phone]

## 2020-09-29 NOTE — ANESTHESIA PROCEDURE NOTES
Spinal Block    Patient location during procedure: OR  Start time: 9/29/2020 10:26 AM  End time: 9/29/2020 10:28 AM  Staffing  Anesthesiologist: Patti Valencia MD  Performed: anesthesiologist   Reason for block: primary anesthetic  Preanesthetic Checklist  Completed: patient identified, site marked, surgical consent, pre-op evaluation, timeout performed, IV checked, risks and benefits discussed, monitors and equipment checked and sterile field maintained during procedure  Spinal Block  Patient position: sitting  Prep: ChloraPrep  Patient monitoring: heart rate, cardiac monitor, continuous pulse ox and blood pressure  Approach: midline  Location: L2-3  Injection technique: single-shot  Needle  Needle type: pencil-tip   Needle gauge: 25 G  Assessment  Sensory level: t6.  Events: cerebrospinal fluid  Medications Administered - 9/29/2020 10:28 AM   Bupivacaine PF (MARCAINE SPINAL) 0.75% intrathecal injection, 2 mL

## 2020-09-30 VITALS
TEMPERATURE: 98 F | RESPIRATION RATE: 16 BRPM | HEIGHT: 65 IN | DIASTOLIC BLOOD PRESSURE: 70 MMHG | HEART RATE: 58 BPM | BODY MASS INDEX: 37.99 KG/M2 | SYSTOLIC BLOOD PRESSURE: 139 MMHG | OXYGEN SATURATION: 95 % | WEIGHT: 228 LBS

## 2020-09-30 LAB
ANION GAP SERPL CALC-SCNC: 10 MEQ/L (ref 3–15)
BUN SERPL-MCNC: 17 MG/DL (ref 8–20)
CALCIUM SERPL-MCNC: 9 MG/DL (ref 8.9–10.3)
CHLORIDE SERPL-SCNC: 102 MEQ/L (ref 98–109)
CO2 SERPL-SCNC: 21 MEQ/L (ref 22–32)
CREAT SERPL-MCNC: 0.9 MG/DL (ref 0.6–1.1)
ERYTHROCYTE [DISTWIDTH] IN BLOOD BY AUTOMATED COUNT: 12.9 % (ref 11.7–14.4)
GFR SERPL CREATININE-BSD FRML MDRD: >60 ML/MIN/1.73M*2
GLUCOSE SERPL-MCNC: 184 MG/DL (ref 70–99)
HCT VFR BLDCO AUTO: 30.4 % (ref 35–45)
HGB BLD-MCNC: 10.2 G/DL (ref 11.8–15.7)
MAGNESIUM SERPL-MCNC: 1.6 MG/DL (ref 1.8–2.5)
MCH RBC QN AUTO: 31.2 PG (ref 28–33.2)
MCHC RBC AUTO-ENTMCNC: 33.6 G/DL (ref 32.2–35.5)
MCV RBC AUTO: 93 FL (ref 83–98)
PDW BLD AUTO: 10.3 FL (ref 9.4–12.3)
PLATELET # BLD AUTO: 189 K/UL (ref 150–369)
POTASSIUM SERPL-SCNC: 4.4 MEQ/L (ref 3.6–5.1)
RBC # BLD AUTO: 3.27 M/UL (ref 3.93–5.22)
SODIUM SERPL-SCNC: 133 MEQ/L (ref 136–144)
WBC # BLD AUTO: 11.84 K/UL (ref 3.8–10.5)

## 2020-09-30 PROCEDURE — 80048 BASIC METABOLIC PNL TOTAL CA: CPT | Performed by: PHYSICIAN ASSISTANT

## 2020-09-30 PROCEDURE — 63700000 HC SELF-ADMINISTRABLE DRUG: Performed by: PHYSICIAN ASSISTANT

## 2020-09-30 PROCEDURE — 83735 ASSAY OF MAGNESIUM: CPT | Performed by: PHYSICIAN ASSISTANT

## 2020-09-30 PROCEDURE — 36415 COLL VENOUS BLD VENIPUNCTURE: CPT | Performed by: PHYSICIAN ASSISTANT

## 2020-09-30 PROCEDURE — 97116 GAIT TRAINING THERAPY: CPT | Mod: GP

## 2020-09-30 PROCEDURE — 97535 SELF CARE MNGMENT TRAINING: CPT | Mod: GO

## 2020-09-30 PROCEDURE — 63600000 HC DRUGS/DETAIL CODE: Performed by: PHYSICIAN ASSISTANT

## 2020-09-30 PROCEDURE — 85027 COMPLETE CBC AUTOMATED: CPT | Performed by: PHYSICIAN ASSISTANT

## 2020-09-30 PROCEDURE — 97166 OT EVAL MOD COMPLEX 45 MIN: CPT | Mod: GO

## 2020-09-30 RX ORDER — HYDROCODONE BITARTRATE AND ACETAMINOPHEN 5; 325 MG/1; MG/1
2 TABLET ORAL EVERY 6 HOURS PRN
Status: DISCONTINUED | OUTPATIENT
Start: 2020-09-30 | End: 2020-09-30 | Stop reason: HOSPADM

## 2020-09-30 RX ORDER — ENOXAPARIN SODIUM 100 MG/ML
30 INJECTION SUBCUTANEOUS
Qty: 6 ML | Refills: 0 | Status: SHIPPED | OUTPATIENT
Start: 2020-09-30 | End: 2020-10-10

## 2020-09-30 RX ORDER — LANOLIN ALCOHOL/MO/W.PET/CERES
200 CREAM (GRAM) TOPICAL DAILY
Status: DISCONTINUED | OUTPATIENT
Start: 2020-09-30 | End: 2020-09-30 | Stop reason: HOSPADM

## 2020-09-30 RX ORDER — OXYCODONE HYDROCHLORIDE 5 MG/1
5-10 TABLET ORAL EVERY 4 HOURS PRN
Qty: 30 TABLET | Refills: 0 | Status: CANCELLED | OUTPATIENT
Start: 2020-09-30 | End: 2020-10-05

## 2020-09-30 RX ORDER — ENOXAPARIN SODIUM 100 MG/ML
30 INJECTION SUBCUTANEOUS
Qty: 6 ML | Refills: 0 | Status: SHIPPED | OUTPATIENT
Start: 2020-09-30 | End: 2020-09-30 | Stop reason: SDUPTHER

## 2020-09-30 RX ORDER — HYDROCODONE BITARTRATE AND ACETAMINOPHEN 5; 325 MG/1; MG/1
1 TABLET ORAL EVERY 6 HOURS PRN
Status: DISCONTINUED | OUTPATIENT
Start: 2020-09-30 | End: 2020-09-30 | Stop reason: HOSPADM

## 2020-09-30 RX ORDER — HYDROCODONE BITARTRATE AND ACETAMINOPHEN 5; 325 MG/1; MG/1
1-2 TABLET ORAL EVERY 6 HOURS PRN
Qty: 30 TABLET | Refills: 0 | Status: SHIPPED | OUTPATIENT
Start: 2020-09-30 | End: 2020-10-05

## 2020-09-30 RX ORDER — TRAMADOL HYDROCHLORIDE 50 MG/1
50-100 TABLET ORAL EVERY 6 HOURS PRN
Qty: 30 TABLET | Refills: 0 | Status: SHIPPED | OUTPATIENT
Start: 2020-09-30 | End: 2020-10-05

## 2020-09-30 RX ORDER — AMOXICILLIN 250 MG
1 CAPSULE ORAL 2 TIMES DAILY PRN
Start: 2020-09-30 | End: 2024-09-05

## 2020-09-30 RX ADMIN — SODIUM CHLORIDE, SODIUM LACTATE, POTASSIUM CHLORIDE, CALCIUM CHLORIDE: 600; 310; 30; 20 INJECTION, SOLUTION INTRAVENOUS at 02:36

## 2020-09-30 RX ADMIN — FLECAINIDE ACETATE 100 MG: 100 TABLET ORAL at 07:40

## 2020-09-30 RX ADMIN — SPIRONOLACTONE 50 MG: 25 TABLET ORAL at 09:43

## 2020-09-30 RX ADMIN — DOCUSATE SODIUM AND SENNOSIDES 1 TABLET: 8.6; 5 TABLET, FILM COATED ORAL at 07:39

## 2020-09-30 RX ADMIN — HYDROCODONE BITARTRATE AND ACETAMINOPHEN 2 TABLET: 5; 325 TABLET ORAL at 15:36

## 2020-09-30 RX ADMIN — CEFAZOLIN SODIUM 2 G: 2 SOLUTION INTRAVENOUS at 02:25

## 2020-09-30 RX ADMIN — LOSARTAN POTASSIUM 50 MG: 50 TABLET, FILM COATED ORAL at 09:43

## 2020-09-30 RX ADMIN — ONDANSETRON 4 MG: 4 TABLET, ORALLY DISINTEGRATING ORAL at 05:27

## 2020-09-30 RX ADMIN — OXYCODONE HYDROCHLORIDE 10 MG: 5 TABLET ORAL at 05:27

## 2020-09-30 RX ADMIN — DILTIAZEM HYDROCHLORIDE 120 MG: 120 CAPSULE, COATED, EXTENDED RELEASE ORAL at 07:38

## 2020-09-30 RX ADMIN — OXYCODONE HYDROCHLORIDE 10 MG: 5 TABLET ORAL at 09:43

## 2020-09-30 RX ADMIN — GABAPENTIN 100 MG: 100 CAPSULE ORAL at 07:39

## 2020-09-30 RX ADMIN — ENOXAPARIN SODIUM 30 MG: 30 INJECTION SUBCUTANEOUS at 05:28

## 2020-09-30 RX ADMIN — POLYETHYLENE GLYCOL 3350 17 G: 17 POWDER, FOR SOLUTION ORAL at 07:39

## 2020-09-30 RX ADMIN — OXYCODONE HYDROCHLORIDE 5 MG: 5 TABLET ORAL at 00:35

## 2020-09-30 RX ADMIN — TRAMADOL HYDROCHLORIDE 50 MG: 50 TABLET, COATED ORAL at 11:09

## 2020-09-30 RX ADMIN — ACETAMINOPHEN 975 MG: 325 TABLET, FILM COATED ORAL at 05:27

## 2020-09-30 RX ADMIN — Medication 200 MG: at 11:07

## 2020-09-30 RX ADMIN — SERTRALINE HYDROCHLORIDE 50 MG: 50 TABLET, FILM COATED ORAL at 07:39

## 2020-09-30 ASSESSMENT — COGNITIVE AND FUNCTIONAL STATUS - GENERAL
HELP NEEDED FOR PERSONAL GROOMING: 4 - NONE
WALKING IN HOSPITAL ROOM: 4 - NONE
HELP NEEDED FOR BATHING: 3 - A LITTLE
DRESSING REGULAR UPPER BODY CLOTHING: 4 - NONE
TOILETING: 4 - NONE
AFFECT: WFL
EATING MEALS: 4 - NONE
STANDING UP FROM CHAIR USING ARMS: 3 - A LITTLE
MOVING TO AND FROM BED TO CHAIR: 3 - A LITTLE
CLIMB 3 TO 5 STEPS WITH RAILING: 3 - A LITTLE
DRESSING REGULAR LOWER BODY CLOTHING: 3 - A LITTLE

## 2020-09-30 NOTE — PLAN OF CARE
Problem: Adult Inpatient Plan of Care  Goal: Plan of Care Review  Outcome: Progressing  Flowsheets (Taken 9/30/2020 1026)  Plan of Care Reviewed With: patient  Outcome Summary: OT IE completed. No further inpt OT needs indicated, recommend home with spouse at d/c. Will d/c OT orders.

## 2020-09-30 NOTE — PROGRESS NOTES
"Patient: Barbara Landeros  Location: [place] [address]  MRN: [id number]  Today's date: 9/30/2020     Session ended c pt seated in bedside chair, all immediate needs within reach, NAD/BARRY, RN present.     Barbara is a 64 y.o. female admitted on 9/29/2020 with Left knee DJD.     Past Medical History  Barbara has a past medical history of A-fib (CMS/HCC), Anxiety, Arthritis, Asthma, H. pylori infection (2015), Heart palpitations, Hypertension, Lipid disorder, and Pericarditis (2010).    History of Present Illness   9/29 -> L TKA    OT Vitals    Date/Time Pulse HR Source BP BP Location BP Method Pt Position Observations [facility]   09/30/20 0928 71 Monitor 185/84 Right upper arm Automatic Sitting OT - RN aware TX   OT Pain    Date/Time Pain Type Pref Pain Scale Side Orientation Location Rating: Rest Rating: Activity Interventions [facility]   09/30/20 0928 Pain Assessment word (verbal rating pain scale) Left incisional knee 3 4 position adjusted [place]          Prior Living Environment      Most Recent Value   Lives With  spouse   Living Environment Comment  [place] [facility] [place]  can enter to \"basement level\" by walking up steep driveway,  or can enter main level of home with 3+9+7+1 LATRICE with HR,  bedroom/bathroom on highest level,  tub shower with GB and SC          Prior Level of Function      Most Recent Value   Ambulation  independent   Transferring  independent   Toileting  independent   Bathing  independent   Dressing  independent   Eating  independent   Communication  understands/communicates without difficulty   Prior Level of Function Comment  pt reports independence with mobility PTA,  pt drives   Assistive Device/Animal Currently Used at Home  commode, 3-in-1, walker, front-wheeled          Occupational Profile      Most Recent Value   Reason for Services/Referral  S/p L TKA   Occupational History/Life Experiences     Environmental Supports and Barriers  Previous hip surgeries - familiar with " recovery   Patient Goals  To go home today           OT Evaluation and Treatment - 09/30/20 0928        Time Calculation    Start Time  0928     Stop Time  0952     Time Calculation (min)  24 min        Session Details    Document Type  initial evaluation     Mode of Treatment  occupational therapy        General Information    Patient Profile Reviewed?  yes     Onset of Illness/Injury or Date of Surgery  09/29/20     Referring Physician  Rafi     General Observations of Patient  Pt rec'd resting OOB in recliner      Existing Precautions/Restrictions  fall;weight bearing        Weight-Bearing Status    Left LE Weight-Bearing Status  weight-bearing as tolerated (WBAT)        Cognition/Psychosocial    Affect/Mental Status (Cognitive)  WFL     Orientation Status (Cognition)  oriented x 4     Follows Commands (Cognition)  WFL     Cognitive Function (Cognitive)  WFL     Comment, Cognition  Very pleasant/cooperative; good carry-over of new learning         Hearing Assessment    Hearing Status  WFL        Vision Assessment/Intervention    Visual Impairment/Limitations  corrective lenses full time        Sensory Assessment (Somatosensory)    Sensory Assessment (Somatosensory)  UE sensation intact        Range of Motion (ROM)    Range of Motion  ROM is WFL;bilateral upper extremities        Strength (Manual Muscle Testing)    Strength (Manual Muscle Testing)  strength is WFL;bilateral upper extremities        Bed Mobility    Comment (Bed Mobility)  Rec'd OOB at time of session        Transfers    Transfers  toilet transfer;tub transfer     Maintains Weight-Bearing Status (Transfers)  able to maintain weight-bearing status     Comment  Mod I for functional transfers/ambulation with RW         Sit to Stand Transfer    [place], Sit to Stand Transfer  modified independence     Assistive Device  walker, front-wheeled     Comment  From recliner and toilet         Stand to Sit Transfer    [place], Stand to Sit  Transfer  modified independence     Assistive Device  walker, front-wheeled     Comment  To toilet and recliner         Toilet Transfer    Transfer Technique  stand-sit;sit-stand     [place], Toilet Transfer  modified independence;verbal cues     Verbal Cues  technique     Assistive Device  commode, 3-in-1;walker, front-wheeled     Comment  Commode frame over toilet; pt reports owning a commode at home         Tub Transfer    Transfer Technique  step over, right entry     Assistive Device  shower chair;walker, front-wheeled     Comment  Educated pt on safe tub shower transfer with RW; recommend pt has spouse present for transfer. OT provided verbal and visual simulated demonstration in room - pt verbalizes understanding        Balance    Balance Assessment  sit to stand dynamic balance;standing static balance;standing dynamic balance     Static Sitting Balance  WFL     Dynamic Sitting Balance  WFL     Sit to Stand Dynamic Balance  mild impairment     Comment, Balance  Maintians balance within RW during LBD/toileting ADLs         Basic Activities of Daily Living (BADLs)    Energy Conservation Techniques  activity adapted to utilize sitting position;ask for assistance with difficult tasks        Lower Body Dressing    Self-Performance  threads left leg, underpants;threads right leg, underpants;pulls underpants up or down     [place] Assistance  dons/doffs left shoe     [place]  modified independence     Position  supported sitting;supported standing     Adaptive Equipment  none     Comment  Assist only for L shoe due to pain/swelling - pt reports slip-on shoes at home        Grooming    Self-Performance  washes, rinses and dries hands     [place]  modified independence     Position  unsupported standing;sink side     Adaptive Equipment  none     Comment  Standing within RW at sink; good positioning of AD         Toileting    [place]  modified independence;verbal cues;perform bladder  hygiene;adjust/manage clothing     Position  supported sitting;supported standing     Adaptive Equipment  commode, drop-arm     Comment  Semi-squat technique at commode frame         AM-PAC (TM) - ADL (Current Function)    Putting on and taking off regular lower body clothing?  3 - A Little     Bathing?  3 - A Little     Toileting?  4 - None     Putting on/taking off regular upper body clothing?  4 - None     How much help for taking care of personal grooming?  4 - None     Eating meals?  4 - None     AM-PAC (TM) ADL Score  22        Therapy Assessment/Plan (OT)    Therapy Frequency (OT)  evaluation only        Progress Summary (OT)    Daily Outcome Statement (OT)  Pt seen for OT IE s/p L TKA. Overall, pt mod I for functional transfers/ambulation with RW and seated/standing ADLs. Pt with good carry-over of all new learnning, requires assist only for distal L LBD dressing and tub transfer which she reports spouse can provide at home as needed. No further inpt OT needs indicated, recommend pt returns home with spouse once medically stable.      Symptoms Noted During/After Treatment  increased pain        Therapy Plan Review/Discharge Plan (OT)    OT Recommended Discharge Disposition  home with assist     Anticipated Equipment Needs At Discharge (OT)  none                   Education provided this session. See the Patient Education summary report for full details.

## 2020-09-30 NOTE — PROGRESS NOTES
Patient: Barbara Landeros   MRN: [id number]   : 1955       Daily Progress Note  (LOS: 1 days) 1 Day Post-Op s/p Procedure(s):  LEFT TOTAL KNEE ARTHROPLASTY     Subjective     64 y.o. y/o female s/p Procedure(s):  LEFT TOTAL KNEE ARTHROPLASTY.     Patient is doing well. Pain is controlled. Denies chest pain, shortness of breath. Admits to mild intermittent nausea but no vomiting. Denies flatus or BM. Voiding without difficulties. Tolerating diet. OOB with PT today. Denies fever or chills.     Patient Active Problem List   Diagnosis   • Essential hypertension   • Dyslipidemia   • CAD (coronary artery disease)   • Pericarditis   • CHUY (obstructive sleep apnea)   • Degenerative joint disease of left hip   • Paroxysmal A-fib (CMS/HCC)   • Left knee DJD       Objective     LAST VITALS:    Vitals:    20 0600 20 0700 20 0928 20 0943   BP:  (!) 170/79 (!) 185/84 (!) 174/81   BP Location:   Right upper arm    Patient Position:   Sitting    Pulse:  67 71 66   Resp: 19 16     Temp:  36.7 °C (98 °F)     TempSrc:  Oral     SpO2: 97% 95%     Weight:       Height:           Temp (24hrs), Av.7 °C (98 °F), Min:36.4 °C (97.5 °F), Max:37.1 °C (98.7 °F)        Intake/Output Summary (Last 24 hours) at 2020 1039  Last data filed at 2020 0612  Gross per 24 hour   Intake 3275 ml   Output 1350 ml   Net 1925 ml      No intake/output data recorded.    Allergies: Bystolic [nebivolol] and Prevpac [bkdcotur-metilomeonn-kjujgsara]       PHYSICAL EXAM:    Incision: clean, dry and intact with expected postop edema and ecchymosis. + 5/5 DF/PF/EHL bilateral. +NVI with sensation intact. Distal Pulses: 2+ bilateral,Calves: soft, non tender bilateral      LABS:     Results from last 7 days   Lab Units 20  0818   WBC K/uL 11.84*   HEMOGLOBIN g/dL 10.2*   HEMATOCRIT % 30.4*   PLATELETS K/uL 189   SODIUM mEQ/L 133*   POTASSIUM mEQ/L 4.4   CHLORIDE mEQ/L 102   CO2 mEQ/L 21*   BUN mg/dL 17   CREATININE mg/dL  0.9   GLUCOSE mg/dL 184*   CALCIUM mg/dL 9.0         Assessment/Plan     64 y.o. y/o female s/p Procedure(s):  LEFT TOTAL KNEE ARTHROPLASTY     1 Day Post-Op     1. DVT prophylaxis: Lovenox 30 mg sq BID for 10 days and bilateral SCD  2. PT/OT per protocol  3. Incentive spirometer  4. Pain management: Scheduled: tylenol, neurontin PRN: oxycodone, tramadol, IV dilaudid   5. Bowel regimen: senokot, colace, miralax  6. Cardiology: appreciate recommendations. BP elevated today. Continue to monitor.   7. Expected post op Leukocytosis: Monitor. Pt is afebrile and asymptomatic. Most likely elevated secondary to stress of surgery and/ or decadron given in OR.   7. Hypomagnesia: repleated  8. Hyponatremia: continue to monitor.     Dispo: home today d/c if stable per PT/OT/Cards/Medicine    SIMONA Perdue  9/30/2020  10:39 AM

## 2020-09-30 NOTE — PLAN OF CARE
Plan of Care Review  Plan of Care Reviewed With: patient  Progress: improving  Outcome Summary: pt ambulated to br with assistance. pt pain controlled with pain medication. scd on. pt on co2 monitoring. call bell within reach. bed alarm activated. will continue to monitor

## 2020-09-30 NOTE — PLAN OF CARE
Problem: Adult Inpatient Plan of Care  Goal: Plan of Care Review  Outcome: Progressing  Flowsheets (Taken 9/30/2020 1236)  Progress: improving  Plan of Care Reviewed With: patient  Outcome Summary: mobilty limited by decrease strength and pain     Problem: Adult Inpatient Plan of Care  Goal: Patient-Specific Goal (Individualization)  Outcome: Progressing  Flowsheets (Taken 9/30/2020 1026 by Eddy Hall, OT)  Patient-Specific Goals (Include Timeframe): To go home today 9/30

## 2020-09-30 NOTE — PROGRESS NOTES
"Patient: Barbara Landeros  Location: [place] [address]  MRN: [id number]  Today's date: 9/30/2020    Barbara is a 64 y.o. female admitted on 9/29/2020 with Left knee DJD.     Past Medical History  Barbara has a past medical history of A-fib (CMS/HCC), Anxiety, Arthritis, Asthma, H. pylori infection (2015), Heart palpitations, Hypertension, Lipid disorder, and Pericarditis (2010).    History of Present Illness   9/29 -> L TKA  Concluded session with pt sitting in bed side chair with RN remote in hand.    PT Vitals    Date/Time Pulse SpO2 BP [facility]   09/30/20 0830 71 95 % 170/79 BAYLEE      PT Pain    Date/Time Pain Type Pref Pain Scale Side Orientation Location Rating: Rest Rating: Activity Interventions [facility]   09/30/20 0830 Pain Assessment number (Numeric Rating Pain Scale) Left incisional knee 4 6 position adjusted BAYLEE          Prior Living Environment      Most Recent Value   Lives With  spouse   Living Environment Comment  [place] [facility] [place] enter to \"basement level\" by walking up steep driveway,  or can enter main level of home with 3+9+7+1 LATRICE with HR,  bedroom/bathroom on highest level,  tub shower with GB and SC          Prior Level of Function      Most Recent Value   Ambulation  independent   Transferring  independent   Toileting  independent   Bathing  independent   Dressing  independent   Eating  independent   Communication  understands/communicates without difficulty   Prior Level of Function Comment  pt reports independence with mobility PTA,  pt drives   Assistive Device/Animal Currently Used at Home  commode, 3-in-1, walker, front-wheeled          PT Evaluation and Treatment - 09/30/20 1200        Time Calculation    Start Time  0820     Stop Time  0840     Time Calculation (min)  20 min        Session Details    Document Type  daily treatment/progress note     Mode of Treatment  individual therapy;physical therapy        Range of Motion (ROM)    Knee, Left (ROM)  3-89        " Bed Mobility    [place], Supine to Sit  modified independence        Bed to Chair Transfer    [place], Bed to Chair  supervision        Chair to Bed Transfer    [place], Chair to Bed  not tested        Sit to Stand Transfer    [place], Sit to Stand Transfer  modified independence        Stand to Sit Transfer    [place], Stand to Sit Transfer  modified independence        Gait Training    [place], Gait  modified independence     Assistive Device  walker, front-wheeled     Distance in Feet  200 feet     Gait Pattern Utilized  step-through     Deviations/Abnormal Patterns (Gait)  gait speed decreased;aamir decreased        Stairs Training    [place], Stairs  supervision     Assistive Device  railing     Handrail Location  right side (ascending);left side (descending)     Number of Stairs  9     Ascending Stairs Technique  step-to-step     Descending Stairs Technique  step-to-step        AM-PAC (TM) - Mobility (Current Function)    Turning from your back to your side while in a flat bed without using bedrails?  4 - None     Moving from lying on your back to sitting on the side of a flat bed without using bedrails?  4 - None     Moving to and from a bed to a chair?  3 - A Little     Standing up from a chair using your arms?  3 - A Little     To walk in a hospital room?  4 - None     Climbing 3-5 steps with a railing?  3 - A Little     AM-PAC (TM) Mobility Score  21        Therapy Assessment/Plan (PT)    Rehab Potential (PT)  good, to achieve stated therapy goals     Therapy Frequency (PT)  5-7 times/wk     Problem List  strength     Patient/Family Therapy Goals Statement (PT)  return home        Progress Summary (PT)    Daily Outcome Statement (PT)  Pt doing well from a mobility standpoint and appears safe to return home. D/C PT        Therapy Plan Review/Discharge Plan (PT)    PT Recommended Discharge Disposition  home     Anticipated Equipment Needs at Discharge (PT Eval)  none      Therapy Plan Review (PT)  evaluation/treatment results reviewed                       Education provided this session. See the Patient Education summary report for full details.    PT Goals      Most Recent Value   Bed Mobility Goal 1   Activity/Assistive Device  bed mobility activities, all at 09/29/2020 1538   [place]  independent at 09/29/2020 1538   Time Frame  by discharge at 09/29/2020 1538   Progress/Outcome  goal ongoing at 09/30/2020 1200   Transfer Goal 1   Activity/Assistive Device  all transfers, walker, front-wheeled at 09/29/2020 1538   [place]  modified independence at 09/29/2020 1538   Time Frame  by discharge at 09/29/2020 1538   Progress/Outcome  goal ongoing at 09/30/2020 1200   Gait Training Goal 1   Activity/Assistive Device  gait (walking locomotion), walker, front-wheeled at 09/29/2020 1538   [place]  modified independence at 09/29/2020 1538   Distance  150 at 09/29/2020 1538   Time Frame  by discharge at 09/29/2020 1538   Progress/Outcome  goal ongoing at 09/30/2020 1200   Stairs Goal 1   Activity/Assistive Device  stairs, all skills, step-to-step, using handrail, right at 09/29/2020 1538   [place]  modified independence at 09/29/2020 1538   Number of Stairs  12 at 09/29/2020 1538   Time Frame  by discharge at 09/29/2020 1538   Progress/Outcome  goal ongoing at 09/30/2020 1200

## 2020-09-30 NOTE — DISCHARGE INSTRUCTIONS
Please follow up with Tenzin Luevano Sr., DO regarding hyponatremia post op in 2 weeks.     TOTAL KNEE REPLACEMENT DISCHARGE INSTRUCTIONS    You have just had a total knee replacement and it is important to follow these specific rules to ensure your safety.  Please look over these instructions before your discharge and ask questions about anything you do not understand.  Call [facility] at: # [phone] to schedule your post-operative for a post-operative appointment in 2-4 weeks with Dr. Mckee.  You will need a postoperative x-ray before your visit with Dr. Mckee which will be done in the office at our post-operative visit.    1) Continue to use your walker/crutches as much as possible, putting as much weight on your operated leg as is comfortable for you.  You may walk indoors or outdoors.  Take frequent short walks and increase distance gradually, with frequent rests periods when necessary.    2)  Climb stairs as you have been instructed. To go UP STAIRS, lead with your un-operated leg first. To go DOWN STAIRS, lead with your operated leg first.    3)  Do the exercises as instructed by Dr. Mckee and the physical therapist.    4) No tub bath.  You may shower as long as the incision is not draining. Your incision may be washed with mild soap and water but do not use deodorant soaps. Do not rub the incision; just let the water run over it.  Lightly pat dry with a towel.    5) No ointments or creams on incision site for 6 weeks    6) Do not cover incision unless it is draining.      7) Only short car rides after until your first post-operative visit after discharge are permitted.  No driving for at least 3 to 4 weeks after surgery.    8) Take only the medications prescribed by Dr Mckee listed on your discharge medication list.    9)   Ice area several times daily on the inside and outside of the knee as well as the thigh of the operated leg.  Do not place ice directly on incision  site.     10)   Elevate your operative leg with pillow under your ankle (not under knee) to help reduce swelling and help with extension.    11)  Bowel regimen: continue to take either Miralax daily or senna/colace twice a day until your bowel function has returned to normal and you have moved your bowels by postoperative day 3. If you have not had a bowel movement by postoperative day 3, increase your Miralax to twice a day and/or use a dulcolax suppository.  If no bowel movement after increasing your medications, contact your primary care provider for further management and treatment.     12)   Call Dr. Mckee’s office at [phone] or:  • Increased pain, redness, warmth, swelling, or drainage from your incision  • Temperature elevation above 100.5 degrees    13)     will help you arrange your equipment for discharge.  Let your nurse know if the  has not contacted you prior to discharge    14)   Whenever you have an operative or invasive procedure (dental, urologic, podiatric, etc.), check with your physician to remind her/him that you have a total knee prosthesis and may need antibiotics before the procedure.  This will be reviewed at your first post-op visit.    15) Bowel regimen: Continue to take either Miralax daily or senna/colace twice a day until your bowel function has returned to normal. If you have not had a bowel movement by postoperative day 3, increase your Miralax to twice a day and/or use a dulcolax suppository.  If you have not had a bowel movement by postoperative day 4 after increasing your bowel medications, contact your primary care provider for further management and treatment.       Questions? - Call Claduia Graves RN - # [phone]

## 2020-09-30 NOTE — PLAN OF CARE
Problem: Adult Inpatient Plan of Care  Goal: Plan of Care Review  Flowsheets  Taken 9/30/2020 0903 by Amy Pate MSW  Outcome Summary: SW met with pt 1:1 for initial assessment. Pt lives with  with around 7-10 stairs to enter and another flight inside. Pt bed and bath is on 2nd floor. Pt has a commode and RW already. Pt confirmed PCP [facility] and medicare. Pt is indepenedent with all ADL'S. Pt denies going to SNF/HHC. Pt denies d/c needs at this time. Sw willfollwo for d/c planning needs.  Taken 9/30/2020 0415 by Mirna tSeele RN  Plan of Care Reviewed With: patient  Goal: Readiness for Transition of Care  Intervention: Mutually Develop Transition Plan  Flowsheets (Taken 9/30/2020 0903)  Anticipated Discharge Disposition: home without services  Equipment Needed After Discharge: none  Discharge Coordination/Progress: Home with family  Assistive Device/Animal Currently Used at Home:   commode, 3-in-1   walker, front-wheeled  Readmission Within the Last 30 Days: no previous admission in last 30 days  Patient/Family Anticipates Transition to: home with family  Transportation Anticipated: family or friend will provide  Concerns to be Addressed:   no discharge needs identified   denies needs/concerns at this time

## 2020-09-30 NOTE — PATIENT CARE CONFERENCE
Care Progression Rounds Note  Date: 9/30/2020  Time: 10:35 AM     Patient Name: Barbara Landeros     Medical Record Number: [id number]   YOB: 1955  Sex: Female      Room/Bed: 0190    Admitting Diagnosis: Osteoarthritis of left knee, unspecified osteoarthritis type [M17.12]  Left knee DJD [M17.12]   Admit Date/Time: 9/29/2020  7:00 AM    Primary Diagnosis: No Principal Problem: There is no principal problem currently on the Problem List. Please update the Problem List and refresh.  Principal Problem: No Principal Problem: There is no principal problem currently on the Problem List. Please update the Problem List and refresh.    GMLOS: pending  Anticipated Discharge Date: 9/30/2020    AM-PAC  Mobility Score: 14    Discharge Planning:  Anticipated Discharge Disposition: home without services    Barriers to Discharge:  Barriers to Discharge: Therapy update pending    Participants:  advanced practice provider, , nursing, physical therapy, physician, social work/services

## 2020-10-01 NOTE — UM PHYSICIAN REVIEW NOTE
Utilization Secondary Review Note      Patient Name: Barbara Landeros      MRN: [id number]  Insurance: MEDICARE  Admission date: 9/29/2020  Initial order:    Inpatient  Planned admission: Yes  Post Discharge Review: Yes            Outpatient services are appropriate for this 64 y.o. year old female who underwent TKA requiring < 2 [place] hospital level care.     Phyllis Siddiqui,   10/1/2020

## 2020-10-01 NOTE — UM PHYSICIAN REVIEW NOTE
Post discharge review    Outpatient services appropriate for this patient with 1 midnight stay s/p TKA.

## 2020-10-12 NOTE — ANESTHESIA POSTPROCEDURE EVALUATION
Patient: Barbara Landeros    Procedure Summary     Date: 09/29/20 Room / [facility]: [facility] OR [facility] / [facility] OR    Anesthesia Start: 1024 Anesthesia Stop: 1248    Procedure: LEFT TOTAL KNEE ARTHROPLASTY (Left ) Diagnosis:       Osteoarthritis of left knee, unspecified osteoarthritis type      (M17.12 OSTEOARTHRITIS)    Surgeon: Sandeep Mckee MD Responsible Provider: Patti Valencia MD    Anesthesia Type: spinal ASA Status: 3          Anesthesia Type: spinal  PACU Vitals  9/29/2020 1246 - 9/29/2020 1346      9/29/2020  1252 9/29/2020  1300 9/29/2020  1315 9/29/2020  1330    BP:  116/61  140/71  134/71  (!) 142/75    Temp:  36.4 °C (97.6 °F)  --  --  --    Pulse:  68  66  64  64    Resp:  16  16  16  16    SpO2:  98 %  98 %  98 %  99 %              9/29/2020  1345             BP:  139/72       Temp:  --       Pulse:  60       Resp:  16       SpO2:  98 %               Anesthesia Post Evaluation    Pain management: satisfactory to patient  Mode of pain management: IV medication  Patient location during evaluation: PACU  Patient participation: complete - patient participated  Level of consciousness: awake and alert  Cardiovascular status: acceptable  Airway Patency: adequate  Respiratory status: acceptable  Hydration status: stable  Anesthetic complications: no

## 2023-05-11 LAB
ALBUMIN SERPL-MCNC: 4.2 G/DL (ref 3.6–5.1)
ALBUMIN/GLOB SERPL: 1.3 (CALC) (ref 1–2.5)
ALP SERPL-CCNC: 84 U/L (ref 37–153)
ALT SERPL-CCNC: 13 U/L (ref 6–29)
AST SERPL-CCNC: 15 U/L (ref 10–35)
BASOPHILS # BLD AUTO: 43 CELLS/UL (ref 0–200)
BASOPHILS NFR BLD AUTO: 0.5 %
BILIRUB SERPL-MCNC: 0.8 MG/DL (ref 0.2–1.2)
BUN SERPL-MCNC: 23 MG/DL (ref 7–25)
BUN/CREAT SERPL: ABNORMAL (CALC) (ref 6–22)
CALCIUM SERPL-MCNC: 9.8 MG/DL (ref 8.6–10.4)
CHLORIDE SERPL-SCNC: 102 MMOL/L (ref 98–110)
CHOLEST SERPL-MCNC: 159 MG/DL
CHOLEST/HDLC SERPL: 3.1 (CALC)
CO2 SERPL-SCNC: 29 MMOL/L (ref 20–32)
CREAT SERPL-MCNC: 0.96 MG/DL (ref 0.5–1.05)
EGFRCR SERPLBLD CKD-EPI 2021: 64 ML/MIN/1.73M2
EOSINOPHIL # BLD AUTO: 136 CELLS/UL (ref 15–500)
EOSINOPHIL NFR BLD AUTO: 1.6 %
ERYTHROCYTE [DISTWIDTH] IN BLOOD BY AUTOMATED COUNT: 12.4 % (ref 11–15)
GLOBULIN SER CALC-MCNC: 3.2 G/DL (CALC) (ref 1.9–3.7)
GLUCOSE SERPL-MCNC: 101 MG/DL (ref 65–99)
HCT VFR BLD AUTO: 37.8 % (ref 35–45)
HDLC SERPL-MCNC: 52 MG/DL
HGB BLD-MCNC: 12.2 G/DL (ref 11.7–15.5)
LDLC SERPL CALC-MCNC: 83 MG/DL (CALC)
LYMPHOCYTES # BLD AUTO: 2295 CELLS/UL (ref 850–3900)
LYMPHOCYTES NFR BLD AUTO: 27 %
MCH RBC QN AUTO: 30.2 PG (ref 27–33)
MCHC RBC AUTO-ENTMCNC: 32.3 G/DL (ref 32–36)
MCV RBC AUTO: 93.6 FL (ref 80–100)
MONOCYTES # BLD AUTO: 697 CELLS/UL (ref 200–950)
MONOCYTES NFR BLD AUTO: 8.2 %
NEUTROPHILS # BLD AUTO: 5330 CELLS/UL (ref 1500–7800)
NEUTROPHILS NFR BLD AUTO: 62.7 %
NONHDLC SERPL-MCNC: 107 MG/DL (CALC)
PLATELET # BLD AUTO: 232 THOUSAND/UL (ref 140–400)
PMV BLD REES-ECKER: 10.4 FL (ref 7.5–12.5)
POTASSIUM SERPL-SCNC: 4.6 MMOL/L (ref 3.5–5.3)
PROT SERPL-MCNC: 7.4 G/DL (ref 6.1–8.1)
RBC # BLD AUTO: 4.04 MILLION/UL (ref 3.8–5.1)
SODIUM SERPL-SCNC: 140 MMOL/L (ref 135–146)
TRIGL SERPL-MCNC: 143 MG/DL
TSH SERPL-ACNC: 2.05 MIU/L (ref 0.4–4.5)
WBC # BLD AUTO: 8.5 THOUSAND/UL (ref 3.8–10.8)

## 2023-06-06 ENCOUNTER — APPOINTMENT (RX ONLY)
Dept: URBAN - METROPOLITAN AREA CLINIC 23 | Facility: CLINIC | Age: 68
Setting detail: DERMATOLOGY
End: 2023-06-06

## 2023-06-06 DIAGNOSIS — L81.4 OTHER MELANIN HYPERPIGMENTATION: ICD-10-CM

## 2023-06-06 DIAGNOSIS — L82.1 OTHER SEBORRHEIC KERATOSIS: ICD-10-CM

## 2023-06-06 DIAGNOSIS — D18.0 HEMANGIOMA: ICD-10-CM

## 2023-06-06 DIAGNOSIS — Z85.828 PERSONAL HISTORY OF OTHER MALIGNANT NEOPLASM OF SKIN: ICD-10-CM

## 2023-06-06 DIAGNOSIS — D22 MELANOCYTIC NEVI: ICD-10-CM

## 2023-06-06 PROBLEM — D23.71 OTHER BENIGN NEOPLASM OF SKIN OF RIGHT LOWER LIMB, INCLUDING HIP: Status: ACTIVE | Noted: 2023-06-06

## 2023-06-06 PROBLEM — D48.5 NEOPLASM OF UNCERTAIN BEHAVIOR OF SKIN: Status: ACTIVE | Noted: 2023-06-06

## 2023-06-06 PROBLEM — D23.72 OTHER BENIGN NEOPLASM OF SKIN OF LEFT LOWER LIMB, INCLUDING HIP: Status: ACTIVE | Noted: 2023-06-06

## 2023-06-06 PROBLEM — D22.5 MELANOCYTIC NEVI OF TRUNK: Status: ACTIVE | Noted: 2023-06-06

## 2023-06-06 PROBLEM — D18.01 HEMANGIOMA OF SKIN AND SUBCUTANEOUS TISSUE: Status: ACTIVE | Noted: 2023-06-06

## 2023-06-06 PROCEDURE — ? BIOPSY BY SHAVE METHOD

## 2023-06-06 PROCEDURE — ? SUNSCREEN RECOMMENDATIONS

## 2023-06-06 PROCEDURE — 99203 OFFICE O/P NEW LOW 30 MIN: CPT | Mod: 25

## 2023-06-06 PROCEDURE — 11102 TANGNTL BX SKIN SINGLE LES: CPT

## 2023-06-06 PROCEDURE — ? COUNSELING

## 2023-06-06 PROCEDURE — ? TREATMENT REGIMEN

## 2023-06-06 ASSESSMENT — LOCATION SIMPLE DESCRIPTION DERM
LOCATION SIMPLE: LEFT CHEEK
LOCATION SIMPLE: UPPER BACK

## 2023-06-06 ASSESSMENT — LOCATION ZONE DERM
LOCATION ZONE: TRUNK
LOCATION ZONE: FACE

## 2023-06-06 ASSESSMENT — LOCATION DETAILED DESCRIPTION DERM
LOCATION DETAILED: LEFT MEDIAL MALAR CHEEK
LOCATION DETAILED: SUPERIOR THORACIC SPINE

## 2023-06-06 NOTE — PROCEDURE: BIOPSY BY SHAVE METHOD
Detail Level: Detailed
Depth Of Biopsy: dermis
Was A Bandage Applied: Yes
Size Of Lesion In Cm: 0
Biopsy Type: H and E
Biopsy Method: Dermablade
Anesthesia Type: 1% lidocaine without epinephrine
Anesthesia Volume In Cc (Will Not Render If 0): 0.5
Hemostasis: Drysol
Wound Care: Petrolatum
Dressing: bandage
Destruction After The Procedure: No
Type Of Destruction Used: Curettage
Curettage Text: The wound bed was treated with curettage after the biopsy was performed.
Cryotherapy Text: The wound bed was treated with cryotherapy after the biopsy was performed.
Electrodesiccation Text: The wound bed was treated with electrodesiccation after the biopsy was performed.
Electrodesiccation And Curettage Text: The wound bed was treated with electrodesiccation and curettage after the biopsy was performed.
Silver Nitrate Text: The wound bed was treated with silver nitrate after the biopsy was performed.
Lab: -[id number]
Consent: Written consent was obtained and risks were reviewed including but not limited to scarring, infection, bleeding, scabbing, incomplete removal, nerve damage and allergy to anesthesia.
Post-Care Instructions: I reviewed with the patient in detail post-care instructions. Patient is to keep the biopsy site dry overnight, and then apply bacitracin twice daily until healed. Patient may apply hydrogen peroxide soaks to remove any crusting.
Notification Instructions: Patient will be notified of biopsy results. However, patient instructed to call the office if not contacted within 2 weeks.
Billing Type: Third-Party Bill
Information: Selecting Yes will display possible errors in your note based on the variables you have selected. This validation is only offered as a suggestion for you. PLEASE NOTE THAT THE VALIDATION TEXT WILL BE REMOVED WHEN YOU FINALIZE YOUR NOTE. IF YOU WANT TO FAX A PRELIMINARY NOTE YOU WILL NEED TO TOGGLE THIS TO 'NO' IF YOU DO NOT WANT IT IN YOUR FAXED NOTE.

## 2023-06-26 ENCOUNTER — APPOINTMENT (RX ONLY)
Dept: URBAN - METROPOLITAN AREA CLINIC 26 | Facility: CLINIC | Age: 68
Setting detail: DERMATOLOGY
End: 2023-06-26

## 2023-06-26 PROBLEM — C44.319 BASAL CELL CARCINOMA OF SKIN OF OTHER PARTS OF FACE: Status: ACTIVE | Noted: 2023-06-26

## 2023-06-26 PROCEDURE — 13132 CMPLX RPR F/C/C/M/N/AX/G/H/F: CPT

## 2023-06-26 PROCEDURE — 17311 MOHS 1 STAGE H/N/HF/G: CPT

## 2023-06-26 PROCEDURE — ? MOHS SURGERY

## 2023-06-26 NOTE — PROCEDURE: MOHS SURGERY
no A-T Advancement Flap Text: The defect edges were debeveled with a #15 scalpel blade.  Given the location of the defect, shape of the defect and the proximity to free margins an A-T advancement flap was deemed most appropriate.  Using a sterile surgical marker, an appropriate advancement flap was drawn incorporating the defect and placing the expected incisions within the relaxed skin tension lines where possible.    The area thus outlined was incised deep to adipose tissue with a #15 scalpel blade and carried over to the primary defect.  The skin margins were undermined to an appropriate distance in all directions utilizing iris scissors.

## 2023-06-26 NOTE — PROCEDURE: MOHS SURGERY
Pain in the back, unable to touch toes, tenderness in lumbar area and lower back. Metatypical Bcc Histology Text: The dermis contains distinctive tumor cell masses of various shapes and sizes composed of cells with large oval or elongated nuclei with relatively little cytoplasm.  The nuclei are homogenous.  There is no pronounced variation in size or intensity of staining and no significant anaplastic appearance.  The cells at the outer aspect of the tumor masses show palisading of nuclei.  Tumor masses are surrounded by a connective tissue stroma and in some areas there is retraction artifact of the tumor nodule away from the surrounding stroma.  Squamous differentiation consistent with metatypical BCC is noted.

## 2023-06-26 NOTE — PROCEDURE: MOHS SURGERY
New standing INR order placed.     Mandi Zafar RN     Mohs Histo Method Verbiage: Each section was then chromacoded and processed in the Mohs laboratory using the Mohs protocol and submitted for tissue processing with frozen sections prepared in the Mohs laboratory. The entire base and margins of the excised tissue were examined by the surgeon.

## 2023-06-26 NOTE — PROCEDURE: MOHS SURGERY
Depth Of Tumor Invasion (For Histology): tumor not visualized (deep and peripheral margins are clear of tumor) normal balance

## 2023-06-26 NOTE — PROCEDURE: MOHS SURGERY
98.2 Complex Repair And Graft Additional Text (Will Appearing After The Standard Complex Repair Text): The complex repair was not sufficient to completely close the primary defect. The remaining additional defect was repaired with the graft mentioned below.

## 2024-06-12 ENCOUNTER — TELEPHONE (OUTPATIENT)
Dept: PRIMARY CARE | Facility: CLINIC | Age: 69
End: 2024-06-12
Payer: MEDICARE

## 2024-08-29 SDOH — ECONOMIC STABILITY: TRANSPORTATION INSECURITY
IN THE PAST 12 MONTHS, HAS LACK OF TRANSPORTATION KEPT YOU FROM MEETINGS, WORK, OR FROM GETTING THINGS NEEDED FOR DAILY LIVING?: NO

## 2024-08-29 SDOH — ECONOMIC STABILITY: INCOME INSECURITY: IN THE LAST 12 MONTHS, WAS THERE A TIME WHEN YOU WERE NOT ABLE TO PAY THE MORTGAGE OR RENT ON TIME?: NO

## 2024-08-29 SDOH — ECONOMIC STABILITY: FOOD INSECURITY: WITHIN THE PAST 12 MONTHS, THE FOOD YOU BOUGHT JUST DIDN'T LAST AND YOU DIDN'T HAVE MONEY TO GET MORE.: NEVER TRUE

## 2024-08-29 SDOH — ECONOMIC STABILITY: FOOD INSECURITY: WITHIN THE PAST 12 MONTHS, YOU WORRIED THAT YOUR FOOD WOULD RUN OUT BEFORE YOU GOT MONEY TO BUY MORE.: NEVER TRUE

## 2024-08-29 SDOH — ECONOMIC STABILITY: TRANSPORTATION INSECURITY
IN THE PAST 12 MONTHS, HAS THE LACK OF TRANSPORTATION KEPT YOU FROM MEDICAL APPOINTMENTS OR FROM GETTING MEDICATIONS?: NO

## 2024-08-29 ASSESSMENT — SOCIAL DETERMINANTS OF HEALTH (SDOH): IN THE PAST 12 MONTHS, HAS THE ELECTRIC, GAS, OIL, OR WATER COMPANY THREATENED TO SHUT OFF SERVICE IN YOUR HOME?: NO

## 2024-09-05 ENCOUNTER — OFFICE VISIT (OUTPATIENT)
Dept: FAMILY MEDICINE | Facility: CLINIC | Age: 69
End: 2024-09-05
Payer: MEDICARE

## 2024-09-05 VITALS
SYSTOLIC BLOOD PRESSURE: 140 MMHG | OXYGEN SATURATION: 97 % | BODY MASS INDEX: 34.72 KG/M2 | HEART RATE: 73 BPM | HEIGHT: 66 IN | DIASTOLIC BLOOD PRESSURE: 82 MMHG | WEIGHT: 216 LBS | TEMPERATURE: 97.7 F

## 2024-09-05 DIAGNOSIS — I25.10 CORONARY ARTERY DISEASE INVOLVING NATIVE HEART WITHOUT ANGINA PECTORIS, UNSPECIFIED VESSEL OR LESION TYPE: ICD-10-CM

## 2024-09-05 DIAGNOSIS — I10 ESSENTIAL HYPERTENSION: ICD-10-CM

## 2024-09-05 DIAGNOSIS — R29.2: Primary | ICD-10-CM

## 2024-09-05 DIAGNOSIS — I48.0 PAROXYSMAL A-FIB (CMS/HCC): ICD-10-CM

## 2024-09-05 DIAGNOSIS — Z11.59 ENCOUNTER FOR HEPATITIS C SCREENING TEST FOR LOW RISK PATIENT: ICD-10-CM

## 2024-09-05 DIAGNOSIS — Z11.4 ENCOUNTER FOR SCREENING FOR HIV: ICD-10-CM

## 2024-09-05 PROBLEM — G47.33 OSA (OBSTRUCTIVE SLEEP APNEA): Status: RESOLVED | Noted: 2018-04-19 | Resolved: 2024-09-05

## 2024-09-05 PROCEDURE — G8754 DIAS BP LESS 90: HCPCS | Performed by: FAMILY MEDICINE

## 2024-09-05 PROCEDURE — G8753 SYS BP > OR = 140: HCPCS | Performed by: FAMILY MEDICINE

## 2024-09-05 PROCEDURE — 99214 OFFICE O/P EST MOD 30 MIN: CPT | Performed by: FAMILY MEDICINE

## 2024-09-05 RX ORDER — FAMOTIDINE 40 MG/1
40 TABLET, FILM COATED ORAL
COMMUNITY
Start: 2022-06-01 | End: 2025-02-17

## 2024-09-05 ASSESSMENT — PATIENT HEALTH QUESTIONNAIRE - PHQ9: SUM OF ALL RESPONSES TO PHQ9 QUESTIONS 1 & 2: 0

## 2024-09-05 NOTE — ASSESSMENT & PLAN NOTE
Concern for spinal cord impingement given symptoms and hyperreflexia and +Irving's.  Check MRI C spine given UE findings.  If negative, I told her we'll need to check MRI T and L spine.  Strict ER warning precautions provided.

## 2024-09-05 NOTE — PROGRESS NOTES
Subjective    Barbara Landeros is a 68 y.o. female presenting today for: NPV      HPI: 69yo female with h/o pAfib s/p cardioversion 2019, HTN, +CAC, HL, intermittent asthma, basal cell ca here to establish care.    Also complaints of episodes of pain that start in feet, then moves up her body to her chest. Episodes have lasted a few seconds, but she had one more severe one that woke her in the middle of the night. It was pretty severe and lasted a couple minutes. She tried to sit up, then stand, and had an episode of carlos a urinary incontinence.    She has known scoliosis and chronic mid back pain.    She has noticed tingling in toes over the past couple months.   She has had a couple isolated episodes of weakness in her leg when trying to lift it when on the couch.    H/o AFIb s/p cardioversion. She has had 2 episodes of afib since her cardioversion that resolved on her own. Maintained on flecainide, xarelto. Also on asa 81.    Subclinical CAD - found on calcium score in 2011. CTA coronaries in 2015 showed non obstructive disease.     HTN: always 110s-120s/60s-70s. Does get elevated in the office.   She has been getting dizzy/lightheaded when standing up from seated or getting out of the car.      Intermittent asthma: Hasn't need albuterol in years.    H/o anxiety/panic: on sertraline 50mg daily.     Health Maintenance Due   Topic Date Due    DEXA Scan  Never done    Colorectal Cancer Screening  Never done    Medicare Annual Wellness Visit  Never done    Hepatitis C Screening  Never done    DTaP, Tdap, and Td Vaccines (1 - Tdap) Never done    RSV (60+ years old [shared decision making] or in pregnancy during 32 through 36 weeks) (1 - 1-dose 60+ series) Never done    Pneumococcal (65 years and older) (1 of 1 - PCV) Never done    Falls Risk Screening  Never done    Influenza Vaccine (1) 08/01/2024    COVID-19 Vaccine (4 - 2023-24 season) 09/01/2024         Patient Active Problem List   Diagnosis    Essential hypertension  "   Dyslipidemia    CAD (coronary artery disease)    Pericarditis    Degenerative joint disease of left hip    Paroxysmal A-fib (CMS/HCC)    Left knee DJD    Irving's reflex positive          Current Outpatient Medications:     acetaminophen (TYLENOL EXTRA STRENGTH) 500 mg tablet, Take 500 mg by mouth daily., Disp: , Rfl:     albuterol HFA (VENTOLIN HFA) 90 mcg/actuation inhaler, Inhale as needed., Disp: , Rfl:     aspirin 81 mg chewable tablet, Take 81 mg by mouth daily., Disp: , Rfl:     atorvastatin (LIPITOR) 40 mg tablet, Take 40 mg by mouth every evening.  , Disp: , Rfl:     dilTIAZem CD (CARDIZEM CD) 120 mg 24 hr capsule, Take 120 mg by mouth daily., Disp: , Rfl:     famotidine (PEPCID) 40 mg tablet, 40 mg., Disp: , Rfl:     flecainide (TAMBOCOR) 100 mg tablet, Take 1 tablet (100 mg total) by mouth 2 (two) times a day for 30 doses., Disp: 30 tablet, Rfl: 1    losartan (COZAAR) 50 mg tablet, Take 50 mg by mouth nightly.  , Disp: , Rfl:     rivaroxaban (XARELTO) 20 mg tablet, Take 1 tablet (20 mg total) by mouth daily with dinner. HOLD until 10/9. Can start taking on 10/10, Disp: 30 tablet, Rfl: 0    sertraline (ZOLOFT) 50 mg tablet, Take 50 mg by mouth every morning.  , Disp: , Rfl:     spironolactone (ALDACTONE) 50 mg tablet, Take 50 mg by mouth daily., Disp: , Rfl:      Allergies   Allergen Reactions    Bystolic [Nebivolol]      Triggers asthma attack    Prevpac [Kimmznln-Jnawaxzpjwt-Pxpuqxxrb] Other (see comments)     Flu symptoms           Review of Systems     Objective  Visit Vitals  BP (!) 140/82 (BP Location: Left upper arm, Patient Position: Sitting)   Pulse 73   Temp 36.5 °C (97.7 °F) (Temporal)   Ht 1.676 m (5' 6\")   Wt 98 kg (216 lb)   SpO2 97%   BMI 34.86 kg/m²    Body mass index is 34.86 kg/m².    Wt Readings from Last 3 Encounters:   09/05/24 98 kg (216 lb)   09/29/20 103 kg (228 lb)   09/24/20 104 kg (230 lb)       BP Readings from Last 3 Encounters:   09/05/24 (!) 140/82   09/30/20 139/70 "   09/24/20 (!) 158/75            Physical Exam  Vitals reviewed.   Constitutional:       Appearance: Normal appearance. She is not ill-appearing.   HENT:      Head: Normocephalic and atraumatic.      Right Ear: External ear normal.      Left Ear: External ear normal.   Eyes:      General:         Right eye: No discharge.         Left eye: No discharge.      Conjunctiva/sclera: Conjunctivae normal.      Pupils: Pupils are equal, round, and reactive to light.   Cardiovascular:      Rate and Rhythm: Normal rate and regular rhythm.      Heart sounds: No murmur heard.  Pulmonary:      Effort: Pulmonary effort is normal. No respiratory distress.      Breath sounds: Normal breath sounds. No wheezing, rhonchi or rales.   Skin:     General: Skin is warm and dry.      Findings: No rash (on exposed skin).   Neurological:      Mental Status: She is alert.      Deep Tendon Reflexes: Babinski sign absent on the right side. Babinski sign absent on the left side.      Reflex Scores:       Bicep reflexes are 3+ on the right side and 3+ on the left side.       Brachioradialis reflexes are 3+ on the right side and 3+ on the left side.       Patellar reflexes are 2+ on the right side and 2+ on the left side.       Achilles reflexes are 2+ on the right side and 2+ on the left side.     Comments: BUE and BLE strength 5/5 throughout.  +Irving's bilaterally   Psychiatric:         Mood and Affect: Mood normal.         Behavior: Behavior normal.               Assessment/Plan  Diagnoses and all orders for this visit:    Irving's reflex positive (Primary)  Assessment & Plan:  Concern for spinal cord impingement given symptoms and hyperreflexia and +Irving's.  Check MRI C spine given UE findings.  If negative, I told her we'll need to check MRI T and L spine.  Strict ER warning precautions provided.    Orders:  -     MRI CERVICAL SPINE WITHOUT CONTRAST; Future    Paroxysmal A-fib (CMS/HCC)  Assessment & Plan:  Stable, continue care per  cardiology.    Orders:  -     CBC and Differential; Future  -     Comprehensive metabolic panel; Future    Coronary artery disease involving native heart without angina pectoris, unspecified vessel or lesion type  Assessment & Plan:  Subclinical.  On aspirin, statin.    Orders:  -     Lipid panel; Future    Encounter for screening for HIV  -     HIV 1,2 AB P24 AG; Future    Encounter for hepatitis C screening test for low risk patient  -     HEPATITIS C AB W/RFX TO HCV RNA,PCR; Future    Essential hypertension  Assessment & Plan:  White coat HTN.  Well controlled at home.                Return in about 3 months (around 12/5/2024).     Kavitha Mancini MD

## 2024-09-06 ENCOUNTER — HOSPITAL ENCOUNTER (OUTPATIENT)
Dept: RADIOLOGY | Age: 69
Discharge: HOME | End: 2024-09-06
Attending: FAMILY MEDICINE
Payer: MEDICARE

## 2024-09-06 DIAGNOSIS — R29.2: ICD-10-CM

## 2024-09-09 ENCOUNTER — TELEPHONE (OUTPATIENT)
Dept: NEUROSURGERY | Facility: CLINIC | Age: 69
End: 2024-09-09
Payer: MEDICARE

## 2024-09-09 DIAGNOSIS — R29.2: ICD-10-CM

## 2024-09-09 DIAGNOSIS — M47.812 CERVICAL SPONDYLOSIS: Primary | ICD-10-CM

## 2024-09-09 LAB
ALBUMIN SERPL-MCNC: 4.2 G/DL (ref 3.6–5.1)
ALBUMIN/GLOB SERPL: 1.4 (CALC) (ref 1–2.5)
ALP SERPL-CCNC: 72 U/L (ref 37–153)
ALT SERPL-CCNC: 13 U/L (ref 6–29)
AST SERPL-CCNC: 13 U/L (ref 10–35)
BASOPHILS # BLD AUTO: 38 CELLS/UL (ref 0–200)
BASOPHILS NFR BLD AUTO: 0.6 %
BILIRUB SERPL-MCNC: 0.6 MG/DL (ref 0.2–1.2)
BUN SERPL-MCNC: 29 MG/DL (ref 7–25)
BUN/CREAT SERPL: 27 (CALC) (ref 6–22)
CALCIUM SERPL-MCNC: 9.6 MG/DL (ref 8.6–10.4)
CHLORIDE SERPL-SCNC: 105 MMOL/L (ref 98–110)
CHOLEST SERPL-MCNC: 155 MG/DL
CHOLEST/HDLC SERPL: 3.1 (CALC)
CO2 SERPL-SCNC: 27 MMOL/L (ref 20–32)
CREAT SERPL-MCNC: 1.06 MG/DL (ref 0.6–1)
EGFRCR SERPLBLD CKD-EPI 2021: 57 ML/MIN/1.73M2
EOSINOPHIL # BLD AUTO: 151 CELLS/UL (ref 15–500)
EOSINOPHIL NFR BLD AUTO: 2.4 %
ERYTHROCYTE [DISTWIDTH] IN BLOOD BY AUTOMATED COUNT: 12.6 % (ref 11–15)
GLOBULIN SER CALC-MCNC: 3 G/DL (CALC) (ref 1.9–3.7)
GLUCOSE SERPL-MCNC: 104 MG/DL (ref 65–99)
HCT VFR BLD AUTO: 36.4 % (ref 35–45)
HCV AB SERPL QL IA: NORMAL
HDLC SERPL-MCNC: 50 MG/DL
HGB BLD-MCNC: 11.8 G/DL (ref 11.7–15.5)
HIV 1+2 AB+HIV1 P24 AG SERPL QL IA: NORMAL
LDLC SERPL CALC-MCNC: 84 MG/DL (CALC)
LYMPHOCYTES # BLD AUTO: 2085 CELLS/UL (ref 850–3900)
LYMPHOCYTES NFR BLD AUTO: 33.1 %
MCH RBC QN AUTO: 30.4 PG (ref 27–33)
MCHC RBC AUTO-ENTMCNC: 32.4 G/DL (ref 32–36)
MCV RBC AUTO: 93.8 FL (ref 80–100)
MONOCYTES # BLD AUTO: 580 CELLS/UL (ref 200–950)
MONOCYTES NFR BLD AUTO: 9.2 %
NEUTROPHILS # BLD AUTO: 3446 CELLS/UL (ref 1500–7800)
NEUTROPHILS NFR BLD AUTO: 54.7 %
NONHDLC SERPL-MCNC: 105 MG/DL (CALC)
PLATELET # BLD AUTO: 200 THOUSAND/UL (ref 140–400)
PMV BLD REES-ECKER: 10.3 FL (ref 7.5–12.5)
POTASSIUM SERPL-SCNC: 4.6 MMOL/L (ref 3.5–5.3)
PROT SERPL-MCNC: 7.2 G/DL (ref 6.1–8.1)
RBC # BLD AUTO: 3.88 MILLION/UL (ref 3.8–5.1)
SODIUM SERPL-SCNC: 140 MMOL/L (ref 135–146)
TRIGL SERPL-MCNC: 111 MG/DL
WBC # BLD AUTO: 6.3 THOUSAND/UL (ref 3.8–10.8)

## 2024-09-09 NOTE — TELEPHONE ENCOUNTER
"Welcome to [facility]     Barbara Landeros   [address] [place] [id number]  [phone]     09/09/24     Dear MsAna Leti,    Welcome to [facility]!  We are delighted you've chosen us for your care.    Your appointment has been scheduled for 10/28/24 at 1PM with Ralph Her MD.  We kindly ask you arrive for your appointment 15 minutes early.  Note that there is a $25 fee if you cancel your appointment with less than 24 hours notice, or if you do not arrive for your appointment.    Please read this important message, which will help you get ready for your appointment.    Arriving   We are located on the campus of [facility], which is located at [address] [place] [address].  You have two options to park:   Self parking is available in parking lot \"B.\"    parking is available at the main lobby.  Follow the signs to main entrance, zone \"A.\"  Wheelchair transport is available from the main lobby, but please arrive at least 20 minutes prior to your appointment if you plan on using this service.  Parking costs up to $7, depending on the length of your appointment.  There is no additional charge to .    Once you have parked, you will find us in Medical Office Building, [facility], [address].  Click here for a map of our campus.      Medical Records  Your provider needs to be able to review all medical records related to your neurologic condition.  If you have received all of your care at [facility], there is nothing you need to do.  We will be able to access these records through our medical records system.    If you have received neurologic care outside of [facility], we need you to obtain these records.  Please have any physician notes and radiology reports faxed to [phone].  Note: We are able to see records from [facility], [facility] (excluding [facility]), [facility] and [facility] via our computer " system.     Your provider needs to be able to personally review your imaging.  If you have underwent an x-ray, CT scan or MRI outside of [facility], you will need to bring a CD with the actual imaging on the disc.  If you were not given a CD at the time of your imaging, please call the radiology facility and request one.  Bring it with you to your appointment.  Regretfully, we may have to cancel your appointment if you do not bring these images with you.    Note About Pain Medications  This office only prescribes opioid medications immediately post-operatively.  If we feel as though you'd benefit from opioid or non-opioid medications, we will refer you to pain management.      Other Things to Bring With You   If your insurance requires a referral, please obtain one prior to your visit.  If your primary care office asks for an NPI, you may provide NPI [id number] - Ralph Her MD.    On the day of your appointment, you should also bring photo ID, your insurance card and your copay.  We accept cash, check and all major credit cards.    We look forward to caring for you.  Please call us at [phone] or send a [employer] message should you have any questions.     Sincerely,   [facility]

## 2024-09-09 NOTE — TELEPHONE ENCOUNTER
Name: GERI WARD     Phone:[phone]    Level/Region: CERVICAL/SPINE     Referring: DR. JAYLAN MEZA  MRI month/year: 09/06/24 W/O    MRI location: [facility]    Other workup/treatment (surgery/EMG/injections): NO SURGERY, NO INJECTONS, NO EMG         The patient was offered the next available appointment.  I informed her that we would collect outside records to facilitate her new patient appointment.   Neurosurgery New Patient Visit Intake

## 2024-09-09 NOTE — TELEPHONE ENCOUNTER
All Imaging completed within [facility].     Sent to Neurosurgery team to review appropriateness of appointment date & time.

## 2024-10-02 ENCOUNTER — OFFICE VISIT (OUTPATIENT)
Dept: FAMILY MEDICINE | Facility: CLINIC | Age: 69
End: 2024-10-02
Payer: MEDICARE

## 2024-10-02 VITALS
SYSTOLIC BLOOD PRESSURE: 144 MMHG | HEIGHT: 66 IN | DIASTOLIC BLOOD PRESSURE: 84 MMHG | BODY MASS INDEX: 35.07 KG/M2 | HEART RATE: 77 BPM | WEIGHT: 218.2 LBS | TEMPERATURE: 97.3 F | OXYGEN SATURATION: 96 %

## 2024-10-02 DIAGNOSIS — J06.9 ACUTE URI: Primary | ICD-10-CM

## 2024-10-02 DIAGNOSIS — E66.01 OBESITY, MORBID (CMS/HCC): ICD-10-CM

## 2024-10-02 LAB
FLUAV RNA SPEC QL NAA+PROBE: NEGATIVE
FLUBV RNA SPEC QL NAA+PROBE: NEGATIVE
RSV RNA SPEC QL NAA+PROBE: NEGATIVE
SARS-COV-2 RNA RESP QL NAA+PROBE: NEGATIVE

## 2024-10-02 PROCEDURE — G8753 SYS BP > OR = 140: HCPCS

## 2024-10-02 PROCEDURE — 99213 OFFICE O/P EST LOW 20 MIN: CPT

## 2024-10-02 PROCEDURE — 87637 SARSCOV2&INF A&B&RSV AMP PRB: CPT

## 2024-10-02 PROCEDURE — G8754 DIAS BP LESS 90: HCPCS

## 2024-10-02 RX ORDER — BENZONATATE 100 MG/1
100 CAPSULE ORAL 3 TIMES DAILY PRN
Qty: 30 CAPSULE | Refills: 0 | Status: SHIPPED | OUTPATIENT
Start: 2024-10-02 | End: 2025-02-17 | Stop reason: ALTCHOICE

## 2024-10-02 RX ORDER — ALBUTEROL SULFATE 90 UG/1
2 INHALANT RESPIRATORY (INHALATION) 4 TIMES DAILY PRN
Qty: 8.5 G | Refills: 0 | Status: SHIPPED | OUTPATIENT
Start: 2024-10-02

## 2024-10-02 ASSESSMENT — ENCOUNTER SYMPTOMS
FATIGUE: 0
SHORTNESS OF BREATH: 0
WHEEZING: 1
CHEST TIGHTNESS: 1
FEVER: 0
COUGH: 1
SORE THROAT: 0
CHILLS: 0

## 2024-10-02 NOTE — PATIENT INSTRUCTIONS
Over the counter plain mucinex for cough    tessalon (benzonatate) as needed for cough  Albuterol inhaler as needed for chest tightness, wheezing or shortness of breath

## 2024-10-02 NOTE — PROGRESS NOTES
[facility]      Reason for visit:   Chief Complaint   Patient presents with    sick visit      Barbara Landeros is a 68 y.o. female who presents for sick visit.    She reports cough beginning about 7-10 days ago, has noted chest tightness and wheezing.  Does have hx of asthma - has not had to use inhaler for many years.  Was taking OTC coricidin, vicks.   Denies SOB or difficulty breathing, chest pain, fever/chills.   Had negative covid test.         Past Medical History:   Diagnosis Date    A-fib (CMS/[facility])     Anxiety     Arthritis     Asthma     H. pylori infection     treated with prev pac     Heart palpitations     Hypertension     Lipid disorder     Pericarditis     hospitalized 3-4 days, followed by Dr Mathur       Past Surgical History   Procedure Laterality Date    Cardioversion  2020    CARDIOVERSION EXTERNAL N/A 5/15/2020    Performed by Claribel Miranda MD at [facility] GI     section      ,     Colonoscopy      Esophagogastroduodenoscopy      Joint replacement Right 2014    hip replacement    Joint replacement Left     hip replacement    Knee arthroplasty Left     Left Total Hip Arthroplasty Left 2018    Performed by Sandeep Mckee MD at [facility]    LEFT TOTAL KNEE ARTHROPLASTY Left 2020    Performed by Sandeep Mckee MD at [facility]    Lung surgery Left 1997    pleural fluid    Skin biopsy      nose, BCC    Tonsillectomy      [facility] tooth extraction         Social History     Tobacco Use    Smoking status: Former     Current packs/day: 0.00     Types: Cigarettes     Quit date:      Years since quittin.    Smokeless tobacco: Never   Vaping Use    Vaping status: Never Used   Substance Use Topics    Alcohol use: Not Currently     Comment: occas    Drug use: No       Family History   Problem Relation Name Age of Onset    Hypertension Biological Mother      Heart disease Biological Mother      Heart attack Biological Mother      Heart disease Biological Father  56  "   Hyperlipidemia Biological Father      Hypertension Biological Sister      Hyperlipidemia Biological Sister      Hypertension Biological Sister      Hyperlipidemia Biological Sister      Breast cancer Neg Hx      Colon cancer Neg Hx         Bystolic [nebivolol] and Prevpac [oqmpncua-dibtsfwfhog-oosqbrcqp]    Current Outpatient Medications on File Prior to Visit   Medication Sig Dispense Refill    acetaminophen (TYLENOL EXTRA STRENGTH) 500 mg tablet Take 500 mg by mouth daily.      aspirin 81 mg chewable tablet Take 81 mg by mouth daily.      atorvastatin (LIPITOR) 40 mg tablet Take 40 mg by mouth every evening.        dilTIAZem CD (CARDIZEM CD) 120 mg 24 hr capsule Take 120 mg by mouth daily.      famotidine (PEPCID) 40 mg tablet 40 mg.      losartan (COZAAR) 50 mg tablet Take 50 mg by mouth nightly.        sertraline (ZOLOFT) 50 mg tablet Take 50 mg by mouth every morning.        spironolactone (ALDACTONE) 50 mg tablet Take 50 mg by mouth daily.      flecainide (TAMBOCOR) 100 mg tablet Take 1 tablet (100 mg total) by mouth 2 (two) times a day for 30 doses. 30 tablet 1    rivaroxaban (XARELTO) 20 mg tablet Take 1 tablet (20 mg total) by mouth daily with dinner. HOLD until 10/9. Can start taking on 10/10 30 tablet 0     No current facility-administered medications on file prior to visit.       Review of Systems   Constitutional:  Negative for chills, fatigue and fever.   HENT:  Negative for congestion and sore throat.    Respiratory:  Positive for cough, chest tightness and wheezing. Negative for shortness of breath.    Cardiovascular:  Negative for chest pain.       Objective   Vitals:    10/02/24 1413   BP: (!) 144/84   BP Location: Left upper arm   Patient Position: Sitting   Pulse: 77   Temp: 36.3 °C (97.3 °F)   TempSrc: Temporal   SpO2: 96%   Weight: 99 kg (218 lb 3.2 oz)   Height: 1.676 m (5' 6\")     Body mass index is 35.22 kg/m².    Physical Exam  Constitutional:       General: She is not in acute " distress.     Appearance: Normal appearance. She is not ill-appearing.   HENT:      Head: Normocephalic.      Right Ear: Tympanic membrane normal.      Left Ear: Tympanic membrane normal.      Nose: Nose normal.      Mouth/Throat:      Mouth: Mucous membranes are moist.      Pharynx: Oropharynx is clear.   Eyes:      Conjunctiva/sclera: Conjunctivae normal.   Cardiovascular:      Rate and Rhythm: Normal rate and regular rhythm.      Heart sounds: Normal heart sounds.   Pulmonary:      Effort: Pulmonary effort is normal. No respiratory distress.      Breath sounds: Normal breath sounds. No wheezing, rhonchi or rales.   Lymphadenopathy:      Cervical: No cervical adenopathy.   Neurological:      Mental Status: She is alert and oriented to person, place, and time.         Procedures    Lab Results   Component Value Date    WBC 6.3 09/09/2024    HGB 11.8 09/09/2024    HCT 36.4 09/09/2024     09/09/2024    CHOL 155 09/09/2024    TRIG 111 09/09/2024    HDL 50 09/09/2024    ALT 13 09/09/2024    AST 13 09/09/2024     09/09/2024    K 4.6 09/09/2024     09/09/2024    CREATININE 1.06 (H) 09/09/2024    BUN 29 (H) 09/09/2024    CO2 27 09/09/2024    TSH 2.05 05/10/2023           Assessment   Problem List Items Addressed This Visit    None  Visit Diagnoses       Acute URI    -  Primary  X 1 week  Suspect viral illness or viral bronchitis  Hx of asthma lungs clear on exam. Sent rx for albuterol prn  Recommend supportive care treatment with mucinex, fluids, benzonatate prn  Follow up if symptoms worsen or persist.      Relevant Orders    COVID-19, FLU A+B AND RSV [facility]                NIKHIL Mahmood  10/2/2024

## 2024-10-07 ENCOUNTER — OFFICE VISIT (OUTPATIENT)
Dept: NEUROSURGERY | Facility: CLINIC | Age: 69
End: 2024-10-07
Payer: MEDICARE

## 2024-10-07 VITALS
DIASTOLIC BLOOD PRESSURE: 74 MMHG | RESPIRATION RATE: 20 BRPM | SYSTOLIC BLOOD PRESSURE: 126 MMHG | HEART RATE: 80 BPM | OXYGEN SATURATION: 98 %

## 2024-10-07 DIAGNOSIS — R29.818 FUNCTIONAL NEUROLOGIC COMPLAINT: ICD-10-CM

## 2024-10-07 DIAGNOSIS — G62.9 NEUROPATHY: ICD-10-CM

## 2024-10-07 DIAGNOSIS — R29.2 ABNORMAL REFLEXES: ICD-10-CM

## 2024-10-07 DIAGNOSIS — M54.15 RADICULOPATHY OF THORACOLUMBAR REGION: Primary | ICD-10-CM

## 2024-10-07 PROCEDURE — 99204 OFFICE O/P NEW MOD 45 MIN: CPT | Performed by: NEUROLOGICAL SURGERY

## 2024-10-07 PROCEDURE — G8752 SYS BP LESS 140: HCPCS | Performed by: NEUROLOGICAL SURGERY

## 2024-10-07 PROCEDURE — G8754 DIAS BP LESS 90: HCPCS | Performed by: NEUROLOGICAL SURGERY

## 2024-10-07 NOTE — LETTER
2024     Kavitha Mancini MD  [address] [place] [address]    Patient: Barbara Landeros  YOB: 1955  Date of Visit: 10/7/2024      Dear Dr. Mancini:    Thank you for referring Barbara Landeros to me for evaluation. Below are my notes for this consultation.    If you have questions, please do not hesitate to call me. I look forward to following your patient along with you.         Sincerely,        Ralph Her MD        CC: Ralph Nicholson MD  10/7/2024  1:50 PM  Sign when Signing Visit      [facility]  Medical Office [facility], [address]  [place] [address]  Phone: [phone]  Fax: [phone]        10/07/24    Referred by:    Re: Barbara Landeros  : 1955      Chief Complaint:  Hyperreflexia, episodic urinary incontinence     History of Present Illness:   Barbara Landeros is a 68 y.o.  female who presents in neurosurgical consultation.  A few weeks prior, she noted intermittent features of pain originating in her feet extending through her legs, torso.  This episode would be self resolving without specific treatment.  There was no provoking event or circumstance.  During one such event she was incontinent of urine.  She sought medical evaluation.  She was found to have effort limited weakness in her upper extremities, hyperreflexia prompting imaging of the cervical spine.  She was thereafter referred to our attention and secondary consultation.    On interview today, she notes persistent features of lower extremity spasms, altered sensation in her feet.  She feels diffusely weak but denies specific features of carlos a numbness, pain, specific weakness, bowel, bladder disturbance.  She ambulates without the need for an assistive device though feels slightly off balance.  She describes a moderate level of impairment in her activities of daily living secondary to the aforementioned  issues.    Medical History:  has a past medical history of A-fib (CMS/HCC), Anxiety, Arthritis, Asthma, H. pylori infection (), Heart palpitations, Hypertension, Lipid disorder, and Pericarditis ().    Surgical History:  has a past surgical history that includes Lung surgery (Left, ); Tonsillectomy (); [place] tooth extraction; Esophagogastroduodenoscopy; Colonoscopy;  section; Joint replacement (Right, 2014); Joint replacement (Left); Skin biopsy; Cardioversion (2020); and Knee Arthroplasty (Left).    Family History: family history includes Heart attack in her biological mother; Heart disease in her biological mother; Heart disease (age of onset: 56) in her biological father; Hyperlipidemia in her biological father, biological sister, and biological sister; Hypertension in her biological mother, biological sister, and biological sister.  Family history non-contributory to neurological condition.    Social History:   Social History     Socioeconomic History   • Marital status:    • Number of children: 3   Occupational History   • Occupation: retired     Comment: former    Tobacco Use   • Smoking status: Former     Current packs/day: 0.00     Types: Cigarettes     Quit date:      Years since quittin.7   • Smokeless tobacco: Never   Vaping Use   • Vaping status: Never Used   Substance and Sexual Activity   • Alcohol use: Not Currently     Comment: occas   • Drug use: No   • Sexual activity: Defer   Social History Narrative    Lives with spouse in a 2 story home     Social Drivers of Health     Food Insecurity: No Food Insecurity (2024)    Hunger Vital Sign    • Worried About Running Out of Food in the Last Year: Never true    • Ran Out of Food in the Last Year: Never true   Transportation Needs: No Transportation Needs (2024)    PRAPARE - Transportation    • Lack of Transportation (Medical): No    • Lack of Transportation (Non-Medical): No   Housing  Stability: Low Risk  (8/29/2024)    Housing Stability Vital Sign    • Unable to Pay for Housing in the Last Year: No    • Number of Places Lived in the Last Year: 1    • Unstable Housing in the Last Year: No        Allergies:   Allergies   Allergen Reactions   • Bystolic [Nebivolol]      Triggers asthma attack   • Prevpac [Kurccnga-Qcqlwzeyswe-Putwarxly] Other (see comments)     Flu symptoms       Medications:   Current Outpatient Medications   Medication Sig Dispense Refill   • acetaminophen (TYLENOL EXTRA STRENGTH) 500 mg tablet Take 500 mg by mouth daily.     • albuterol HFA 90 mcg/actuation inhaler Inhale 2 puffs 4 (four) times a day as needed for shortness of breath or wheezing. 8.5 g 0   • aspirin 81 mg chewable tablet Take 81 mg by mouth daily.     • atorvastatin (LIPITOR) 40 mg tablet Take 40 mg by mouth every evening.       • benzonatate (TESSALON) 100 mg capsule Take 1 capsule (100 mg total) by mouth 3 (three) times a day as needed for cough. 30 capsule 0   • dilTIAZem CD (CARDIZEM CD) 120 mg 24 hr capsule Take 120 mg by mouth daily.     • famotidine (PEPCID) 40 mg tablet 40 mg.     • flecainide (TAMBOCOR) 100 mg tablet Take 1 tablet (100 mg total) by mouth 2 (two) times a day for 30 doses. 30 tablet 1   • losartan (COZAAR) 50 mg tablet Take 50 mg by mouth nightly.       • rivaroxaban (XARELTO) 20 mg tablet Take 1 tablet (20 mg total) by mouth daily with dinner. HOLD until 10/9. Can start taking on 10/10 30 tablet 0   • sertraline (ZOLOFT) 50 mg tablet Take 50 mg by mouth every morning.       • spironolactone (ALDACTONE) 50 mg tablet Take 50 mg by mouth daily.       No current facility-administered medications for this visit.       Review of Systems:   A 14 point review of systems was performed and aside from what is mentioned above is otherwise negative.    General physical examination:  The patient is well appearing female, sitting upright in her chair in no acute distress, she appears her stated age.   Her head is atraumatic, normocephalic. Her neck is supple without obvious adenopathy. Oral mucosa is moist. Her peripheral pulses are symmetric and palpable. Extremities without peripheral edema. Her breathing is normal and unlabored.     Vitals:    10/07/24 1301   BP: 126/74   BP Location: Left upper arm   Patient Position: Sitting   Pulse: 80   Resp: 20   SpO2: 98%          Neurologic examination:  Mental status:  The patient is alert, attentive, and oriented. Speech is clear and fluent with good repetition, comprehension, and naming.  Remote and recent memory are normal as well as fund of knowledge.    Cranial nerves:  CN II: Visual fields are full to confrontation.  Pupils are equal and briskly reactive to light.   CN III, IV, VI: Extra-occular muscles are intact  CN V: Facial sensation is intact and equal in V1-V3 distributions bilaterally.   CN VII: Face is symmetric with normal eye closure and smile.  CN VIII: Hearing is normal to rubbing fingers  CN IX, X: Palate elevates symmetrically. Phonation is normal.  CN XI: Head turning and shoulder shrug are intact  CN XII: Tongue is midline with normal movements and no atrophy.    Motor:  There is no pronator drift.  Muscle bulk and tone are normal.    Deltoid Biceps Triceps Wrist ext Hand  Finger Spread Hip flexion Knee ext Dorsi-  flexion EHL Plantar Flexion   L 5 5 5 5 5 5 5 5 5 5 5   R 5 5 5 5 5 5 5 5 5 5 5     Reflexes:  Reflexes are 3+, brisk and symmetric at the biceps, brachialis, triceps and patellar. Reflexes are normal and symmetric in the achilles. There is no Irving's sign or ankle clonus.    Sensory:   Intact light touch, pinprick, and position sense, throughout all 4 extremities.    Coordination:  There is no dysmetria on finger-to-nose testing.  Romberg is absent.    Gait:  Gait is steady with normal steps.  Heel and toe walking are normal. Tandem gait is normal.      Data Review: Images were personally reviewed by myself and with the  patient.      MRI CERVICAL SPINE WITHOUT CONTRAST (09/6/2024)  Addendum: Margret PAPPAS faxed the report and the results were transmitted electronically  which   were received and acknowledged in EPIC Secure Chat by Kavitha Mancini MD at   4:45pm on 9/6/2024.  Narrative: CLINICAL HISTORY: R29.2: Abnormal reflex    PRIOR STUDY: None relevant    TECHNIQUE: Noncontrast MR cervical spine    COMMENT:  There is straightening the normal cervical lordosis.  The vertebral  bodies are maintained in height, signal and alignment.  There is disc  desiccation noted throughout.  The spinal cord is normal in signal and  morphology.    Unless otherwise noted there is no significant disc herniation, central or  foraminal narrowing.    C2-C3 shows facet hypertrophy.    C3-C4 shows a disc osteophyte complex, uncovertebral and facet hypertrophy  contributing to severe left and moderate right foraminal narrowing.  There is  mild central canal narrowing.    C4-C5 shows a disc osteophyte complex, uncovertebral and facet hypertrophy  contributing to mild bilateral foraminal narrowing and mild central canal  narrowing.    C5-C6 shows a disc osteophyte complex, uncovertebral and facet hypertrophy  contributing to moderate to severe bilateral foraminal narrowing and mild  central canal narrowing.    C6-C7 shows a disc osteophyte complex which contacts and deforms the ventral  spinal cord, uncovertebral and facet hypertrophy contributing to mild bilateral  foraminal narrowing and mild central canal narrowing.    C7-T1 shows a disc osteophyte complex and facet hypertrophy contributing to mild  bilateral foraminal narrowing.    T1-T2 shows a disc osteophyte complex and facet hypertrophy.  Impression: IMPRESSION: Multilevel spondylosis as described above in detail.        Assessment and Plan:    In summary, Barbara Landeros is a 68 y.o. female with ongoing features of lower extremity spasms, paresthesias, intermittent discomfort extending from her  feet to her torso, generalized weakness.  On neurologic examination she has brisk upper extremity reflexes in the absence of focal weakness, sensory disturbance.  Her examination does not appear suggestive of a myelopathic process.  MRI of the cervical spine discloses multilevel degenerative spondylosis, stenosis without high-grade impingement of the spinal cord.  We will obtain imaging of her thoracolumbar spine to rule out issues more distal.  Additionally we have asked she consult with the neurology service regarding functional concerns.    She was counseled at length regarding the natural history of her condition.  I look forward to reviewing the results of her MRI studies via telephone or in person should that be her preference.  In the interim should her symptomatology evolve or worsen, I have asked she return sooner for prompt reevaluation.    Thank you for referring Barbara Landeros  to my attention.  Please feel free to contact me anytime if I can be of further assistance.          Ralph ARANGO I, Alberto Clarke PA-C, am scribing for, and in the presence of Ralph Her MD.    IRalph MD, personally performed the services described in this documentation as scribed by Alberto Clarke PA-C  in my presence, and it is accurate and complete.    I spent 45 minutes on this date of service performing the following activities: obtaining history, performing examination, entering orders, documenting, preparing for visit, obtaining / reviewing records, providing counseling and education, independently reviewing study/studies, communicating results and coordinating care.

## 2024-10-07 NOTE — PROGRESS NOTES
[facility]  Medical Office [address], [address]  [place] [address]  Phone: [phone]  Fax: [phone]        10/07/24    Referred by:    Re: Barbara Landeros  : 1955      Chief Complaint:  Hyperreflexia, episodic urinary incontinence     History of Present Illness:   Barbara Landeros is a 68 y.o.  female who presents in neurosurgical consultation.  A few weeks prior, she noted intermittent features of pain originating in her feet extending through her legs, torso.  This episode would be self resolving without specific treatment.  There was no provoking event or circumstance.  During one such event she was incontinent of urine.  She sought medical evaluation.  She was found to have effort limited weakness in her upper extremities, hyperreflexia prompting imaging of the cervical spine.  She was thereafter referred to our attention and secondary consultation.    On interview today, she notes persistent features of lower extremity spasms, altered sensation in her feet.  She feels diffusely weak but denies specific features of carlos a numbness, pain, specific weakness, bowel, bladder disturbance.  She ambulates without the need for an assistive device though feels slightly off balance.  She describes a moderate level of impairment in her activities of daily living secondary to the aforementioned issues.    Medical History:  has a past medical history of A-fib (CMS/[facility]), Anxiety, Arthritis, Asthma, H. pylori infection (), Heart palpitations, Hypertension, Lipid disorder, and Pericarditis ().    Surgical History:  has a past surgical history that includes Lung surgery (Left, ); Tonsillectomy (); [place] tooth extraction; Esophagogastroduodenoscopy; Colonoscopy;  section; Joint replacement (Right, 2014); Joint replacement (Left); Skin biopsy; Cardioversion (2020); and Knee Arthroplasty (Left).    Family History: family history  includes Heart attack in her biological mother; Heart disease in her biological mother; Heart disease (age of onset: 56) in her biological father; Hyperlipidemia in her biological father, biological sister, and biological sister; Hypertension in her biological mother, biological sister, and biological sister.  Family history non-contributory to neurological condition.    Social History:   Social History     Socioeconomic History    Marital status:     Number of children: 3   Occupational History    Occupation: retired     Comment: former    Tobacco Use    Smoking status: Former     Current packs/day: 0.00     Types: Cigarettes     Quit date:      Years since quittin.7    Smokeless tobacco: Never   Vaping Use    Vaping status: Never Used   Substance and Sexual Activity    Alcohol use: Not Currently     Comment: occas    Drug use: No    Sexual activity: Defer   Social History Narrative    Lives with spouse in a 2 story home     Social Drivers of Health     Food Insecurity: No Food Insecurity (2024)    Hunger Vital Sign     Worried About Running Out of Food in the Last Year: Never true     Ran Out of Food in the Last Year: Never true   Transportation Needs: No Transportation Needs (2024)    PRAPARE - Transportation     Lack of Transportation (Medical): No     Lack of Transportation (Non-Medical): No   Housing Stability: Low Risk  (2024)    Housing Stability Vital Sign     Unable to Pay for Housing in the Last Year: No     Number of Places Lived in the Last Year: 1     Unstable Housing in the Last Year: No        Allergies:   Allergies   Allergen Reactions    Bystolic [Nebivolol]      Triggers asthma attack    Prevpac [Zswyoubl-Ttjcczefyla-Jszffzirt] Other (see comments)     Flu symptoms       Medications:   Current Outpatient Medications   Medication Sig Dispense Refill    acetaminophen (TYLENOL EXTRA STRENGTH) 500 mg tablet Take 500 mg by mouth daily.      albuterol HFA 90  mcg/actuation inhaler Inhale 2 puffs 4 (four) times a day as needed for shortness of breath or wheezing. 8.5 g 0    aspirin 81 mg chewable tablet Take 81 mg by mouth daily.      atorvastatin (LIPITOR) 40 mg tablet Take 40 mg by mouth every evening.        benzonatate (TESSALON) 100 mg capsule Take 1 capsule (100 mg total) by mouth 3 (three) times a day as needed for cough. 30 capsule 0    dilTIAZem CD (CARDIZEM CD) 120 mg 24 hr capsule Take 120 mg by mouth daily.      famotidine (PEPCID) 40 mg tablet 40 mg.      flecainide (TAMBOCOR) 100 mg tablet Take 1 tablet (100 mg total) by mouth 2 (two) times a day for 30 doses. 30 tablet 1    losartan (COZAAR) 50 mg tablet Take 50 mg by mouth nightly.        rivaroxaban (XARELTO) 20 mg tablet Take 1 tablet (20 mg total) by mouth daily with dinner. HOLD until 10/9. Can start taking on 10/10 30 tablet 0    sertraline (ZOLOFT) 50 mg tablet Take 50 mg by mouth every morning.        spironolactone (ALDACTONE) 50 mg tablet Take 50 mg by mouth daily.       No current facility-administered medications for this visit.       Review of Systems:   A 14 point review of systems was performed and aside from what is mentioned above is otherwise negative.    General physical examination:  The patient is well appearing female, sitting upright in her chair in no acute distress, she appears her stated age.  Her head is atraumatic, normocephalic. Her neck is supple without obvious adenopathy. Oral mucosa is moist. Her peripheral pulses are symmetric and palpable. Extremities without peripheral edema. Her breathing is normal and unlabored.     Vitals:    10/07/24 1301   BP: 126/74   BP Location: Left upper arm   Patient Position: Sitting   Pulse: 80   Resp: 20   SpO2: 98%          Neurologic examination:  Mental status:  The patient is alert, attentive, and oriented. Speech is clear and fluent with good repetition, comprehension, and naming.  Remote and recent memory are normal as well as fund of  knowledge.    Cranial nerves:  CN II: Visual fields are full to confrontation.  Pupils are equal and briskly reactive to light.   CN III, IV, VI: Extra-occular muscles are intact  CN V: Facial sensation is intact and equal in V1-V3 distributions bilaterally.   CN VII: Face is symmetric with normal eye closure and smile.  CN VIII: Hearing is normal to rubbing fingers  CN IX, X: Palate elevates symmetrically. Phonation is normal.  CN XI: Head turning and shoulder shrug are intact  CN XII: Tongue is midline with normal movements and no atrophy.    Motor:  There is no pronator drift.  Muscle bulk and tone are normal.    Deltoid Biceps Triceps Wrist ext Hand  Finger Spread Hip flexion Knee ext Dorsi-  flexion EHL Plantar Flexion   L 5 5 5 5 5 5 5 5 5 5 5   R 5 5 5 5 5 5 5 5 5 5 5     Reflexes:  Reflexes are 3+, brisk and symmetric at the biceps, brachialis, triceps and patellar. Reflexes are normal and symmetric in the achilles. There is no Irving's sign or ankle clonus.    Sensory:   Intact light touch, pinprick, and position sense, throughout all 4 extremities.    Coordination:  There is no dysmetria on finger-to-nose testing.  Romberg is absent.    Gait:  Gait is steady with normal steps.  Heel and toe walking are normal. Tandem gait is normal.      Data Review: Images were personally reviewed by myself and with the patient.      MRI CERVICAL SPINE WITHOUT CONTRAST (09/6/2024)  Addendum: Margret PAPPAS faxed the report and the results were transmitted electronically  which   were received and acknowledged in EPIC Secure Chat by Kavitha Mancini MD at   4:45pm on 9/6/2024.  Narrative: CLINICAL HISTORY: R29.2: Abnormal reflex    PRIOR STUDY: None relevant    TECHNIQUE: Noncontrast MR cervical spine    COMMENT:  There is straightening the normal cervical lordosis.  The vertebral  bodies are maintained in height, signal and alignment.  There is disc  desiccation noted throughout.  The spinal cord is normal in signal  and  morphology.    Unless otherwise noted there is no significant disc herniation, central or  foraminal narrowing.    C2-C3 shows facet hypertrophy.    C3-C4 shows a disc osteophyte complex, uncovertebral and facet hypertrophy  contributing to severe left and moderate right foraminal narrowing.  There is  mild central canal narrowing.    C4-C5 shows a disc osteophyte complex, uncovertebral and facet hypertrophy  contributing to mild bilateral foraminal narrowing and mild central canal  narrowing.    C5-C6 shows a disc osteophyte complex, uncovertebral and facet hypertrophy  contributing to moderate to severe bilateral foraminal narrowing and mild  central canal narrowing.    C6-C7 shows a disc osteophyte complex which contacts and deforms the ventral  spinal cord, uncovertebral and facet hypertrophy contributing to mild bilateral  foraminal narrowing and mild central canal narrowing.    C7-T1 shows a disc osteophyte complex and facet hypertrophy contributing to mild  bilateral foraminal narrowing.    T1-T2 shows a disc osteophyte complex and facet hypertrophy.  Impression: IMPRESSION: Multilevel spondylosis as described above in detail.        Assessment and Plan:    In summary, Barbara Landeros is a 68 y.o. female with ongoing features of lower extremity spasms, paresthesias, intermittent discomfort extending from her feet to her torso, generalized weakness.  On neurologic examination she has brisk upper extremity reflexes in the absence of focal weakness, sensory disturbance.  Her examination does not appear suggestive of a myelopathic process.  MRI of the cervical spine discloses multilevel degenerative spondylosis, stenosis without high-grade impingement of the spinal cord.  We will obtain imaging of her thoracolumbar spine to rule out issues more distal.  Additionally we have asked she consult with the neurology service regarding functional concerns.    She was counseled at length regarding the natural history  of her condition.  I look forward to reviewing the results of her MRI studies via telephone or in person should that be her preference.  In the interim should her symptomatology evolve or worsen, I have asked she return sooner for prompt reevaluation.    Thank you for referring Barbara Landeros  to my attention.  Please feel free to contact me anytime if I can be of further assistance.          Alberto Ramon MD, PA-C, am scribing for, and in the presence of Ralph Her MD.    I, Ralph Her MD, personally performed the services described in this documentation as scribed by Alberto Clarke PA-C  in my presence, and it is accurate and complete.    I spent 45 minutes on this date of service performing the following activities: obtaining history, performing examination, entering orders, documenting, preparing for visit, obtaining / reviewing records, providing counseling and education, independently reviewing study/studies, communicating results and coordinating care.

## 2024-10-15 ENCOUNTER — HOSPITAL ENCOUNTER (OUTPATIENT)
Dept: RADIOLOGY | Age: 69
Discharge: HOME | End: 2024-10-15
Attending: PHYSICIAN ASSISTANT
Payer: MEDICARE

## 2024-10-15 DIAGNOSIS — M54.15 RADICULOPATHY OF THORACOLUMBAR REGION: ICD-10-CM

## 2025-02-11 DIAGNOSIS — M16.12 PRIMARY OSTEOARTHRITIS OF LEFT HIP: ICD-10-CM

## 2025-02-11 RX ORDER — SERTRALINE HYDROCHLORIDE 50 MG/1
50 TABLET, FILM COATED ORAL EVERY MORNING
Qty: 90 TABLET | Refills: 0 | Status: SHIPPED | OUTPATIENT
Start: 2025-02-11

## 2025-02-14 SDOH — ECONOMIC STABILITY: FOOD INSECURITY: WITHIN THE PAST 12 MONTHS, THE FOOD YOU BOUGHT JUST DIDN'T LAST AND YOU DIDN'T HAVE MONEY TO GET MORE.: NEVER TRUE

## 2025-02-14 SDOH — ECONOMIC STABILITY: INCOME INSECURITY: IN THE LAST 12 MONTHS, WAS THERE A TIME WHEN YOU WERE NOT ABLE TO PAY THE MORTGAGE OR RENT ON TIME?: NO

## 2025-02-14 SDOH — ECONOMIC STABILITY: FOOD INSECURITY: WITHIN THE PAST 12 MONTHS, YOU WORRIED THAT YOUR FOOD WOULD RUN OUT BEFORE YOU GOT MONEY TO BUY MORE.: NEVER TRUE

## 2025-02-14 ASSESSMENT — SOCIAL DETERMINANTS OF HEALTH (SDOH): IN THE PAST 12 MONTHS, HAS THE ELECTRIC, GAS, OIL, OR WATER COMPANY THREATENED TO SHUT OFF SERVICE IN YOUR HOME?: NO

## 2025-02-17 ENCOUNTER — OFFICE VISIT (OUTPATIENT)
Dept: FAMILY MEDICINE | Facility: CLINIC | Age: 70
End: 2025-02-17
Payer: MEDICARE

## 2025-02-17 VITALS
SYSTOLIC BLOOD PRESSURE: 144 MMHG | HEART RATE: 67 BPM | HEIGHT: 66 IN | TEMPERATURE: 97.5 F | WEIGHT: 218 LBS | DIASTOLIC BLOOD PRESSURE: 90 MMHG | BODY MASS INDEX: 35.03 KG/M2 | OXYGEN SATURATION: 97 %

## 2025-02-17 DIAGNOSIS — M54.9 UPPER BACK PAIN ON LEFT SIDE: Primary | ICD-10-CM

## 2025-02-17 DIAGNOSIS — E66.01 OBESITY, MORBID (CMS/HCC): ICD-10-CM

## 2025-02-17 DIAGNOSIS — I48.0 PAROXYSMAL A-FIB (CMS/HCC): ICD-10-CM

## 2025-02-17 PROCEDURE — G8755 DIAS BP > OR = 90: HCPCS

## 2025-02-17 PROCEDURE — 99213 OFFICE O/P EST LOW 20 MIN: CPT

## 2025-02-17 PROCEDURE — G8753 SYS BP > OR = 140: HCPCS

## 2025-02-17 RX ORDER — METOPROLOL SUCCINATE 25 MG/1
TABLET, EXTENDED RELEASE ORAL
COMMUNITY
Start: 2024-04-19

## 2025-02-17 RX ORDER — GABAPENTIN 300 MG/1
300 CAPSULE ORAL NIGHTLY
Qty: 30 CAPSULE | Refills: 0 | Status: SHIPPED | OUTPATIENT
Start: 2025-02-17 | End: 2025-03-19

## 2025-02-17 RX ORDER — FLECAINIDE ACETATE 100 MG/1
100 TABLET ORAL 2 TIMES DAILY
COMMUNITY

## 2025-02-17 ASSESSMENT — ENCOUNTER SYMPTOMS
CHILLS: 0
LIGHT-HEADEDNESS: 0
VOMITING: 0
COUGH: 0
CHEST TIGHTNESS: 0
FEVER: 0
HEADACHES: 0
NAUSEA: 0
NUMBNESS: 0
DIFFICULTY URINATING: 0
DIZZINESS: 0
BACK PAIN: 1
FATIGUE: 0
SHORTNESS OF BREATH: 0
WEAKNESS: 0
WHEEZING: 0

## 2025-02-17 NOTE — PROGRESS NOTES
[facility]      Reason for visit:   Chief Complaint   Patient presents with    Illness      Barbara Landeros is a 69 y.o. female who presents for back pain.    Last week, was sitting on couch leaning over working on coffee table, when got up to stand felt pain in in her right lower back. She has been resting. Applying heat.   Felt it was improving.    This past weekend on Saturday, felt shooting pain in left upper back. Can feel it radiating into left forearm and hand. Occurred when sitting on couch on ipad.   States she has been sleeping on the cough holding heating pad for her lower back pain.  This pain is much worse then the lower back.    Has been trying heating pad, ice, tylenol, lidocaine patch, icy hot - no improvement     MRI cervical, thoracic and lumbar spine spine in oct 2024 -  was having episode of pain starting in feet and moving up her body. this prompted her to see neurosurgery who recommend she see a neurologist  She states it has gotten better but can occasionally occur, she cancelled neurology appt    Cervical spine -multilevel degenerative spondylosis, stenosis without high-grade impingement of the spinal cord.   1. Degenerative changes of the thoracolumbar spine, notable for a small disc  protrusion at T5-T6 contacting the right ventral cord without cord signal  abnormality.  2. No severe spinal canal stenosis or neural foraminal narrowing in the thoracic  or lumbar levels.            Past Medical History:   Diagnosis Date    A-fib (CMS/[facility])     Anxiety     Arthritis     Asthma     H. pylori infection     treated with prev pac     Heart palpitations     Hypertension     Lipid disorder     Pericarditis     hospitalized 3-4 days, followed by Dr Mathur       Past Surgical History   Procedure Laterality Date    Cardioversion  2020    CARDIOVERSION EXTERNAL N/A 5/15/2020    Performed by Claribel Miranda MD at [facility]     section      ,     Colonoscopy       Esophagogastroduodenoscopy      Joint replacement Right 2014    hip replacement    Joint replacement Left     hip replacement    Knee arthroplasty Left     Left Total Hip Arthroplasty Left 2018    Performed [place]eep Mckee [place] [facility] [place]    LEFT TOTAL KNEE ARTHROPLASTY Left 2020    Performed [place]ep [place] [facility] [place]    Lung surgery Left     pleural fluid    Skin biopsy      nose, BCC    Tonsillectomy      [place] tooth extraction         Social History     Tobacco Use    Smoking status: Former     Current packs/day: 0.00     Types: Cigarettes     Quit date:      Years since quittin.1    Smokeless tobacco: Never   Vaping Use    Vaping status: Never Used   Substance Use Topics    Alcohol use: Not Currently     Comment: occas    Drug use: No       Family History   Problem Relation Name Age of Onset    Hypertension Biological Mother      Heart disease Biological Mother      Heart attack Biological Mother      Heart disease Biological Father  56    Hyperlipidemia Biological Father      Hypertension Biological Sister      Hyperlipidemia Biological Sister      Hypertension Biological Sister      Hyperlipidemia Biological Sister      Breast cancer Neg Hx      Colon cancer Neg Hx         Bystolic [nebivolol], Prevpac [dlnlqgrx-gpafsnizwtc-vastahonk], Amoxicillin, Clarithromycin, and Medroxyprogesterone    Medications Ordered Prior to Encounter[1]    Review of Systems   Constitutional:  Negative for chills, fatigue and fever.   Respiratory:  Negative for cough, chest tightness, shortness of breath and wheezing.    Cardiovascular:  Negative for chest pain.   Gastrointestinal:  Negative for nausea and vomiting.   Genitourinary:  Negative for difficulty urinating.   Musculoskeletal:  Positive for back pain.   Skin:  Negative for rash.   Neurological:  Negative for dizziness, weakness, light-headedness, numbness and headaches.       Objective   Vitals:    25 1359   BP: (!) 144/90  "  BP Location: Right upper arm   Patient Position: Sitting   Pulse: 67   Temp: 36.4 °C (97.5 °F)   TempSrc: Temporal   SpO2: 97%   Weight: 98.9 kg (218 lb)   Height: 1.676 m (5' 6\")     Body mass index is 35.19 kg/m².    Physical Exam  Constitutional:       General: She is not in acute distress.     Appearance: Normal appearance. She is not ill-appearing.   Cardiovascular:      Rate and Rhythm: Normal rate and regular rhythm.      Heart sounds: Normal heart sounds.   Pulmonary:      Effort: Pulmonary effort is normal. No respiratory distress.      Breath sounds: Normal breath sounds. No wheezing.   Musculoskeletal:      Cervical back: Tenderness present. No rigidity, bony tenderness or crepitus. No pain with movement. Normal range of motion.      Thoracic back: No tenderness or bony tenderness.      Lumbar back: No tenderness or bony tenderness. Normal range of motion.      Right lower leg: No edema.      Left lower leg: No edema.   Neurological:      Mental Status: She is alert and oriented to person, place, and time.         Procedures    Lab Results   Component Value Date    WBC 6.3 09/09/2024    HGB 11.8 09/09/2024    HCT 36.4 09/09/2024     09/09/2024    CHOL 155 09/09/2024    TRIG 111 09/09/2024    HDL 50 09/09/2024    ALT 13 09/09/2024    AST 13 09/09/2024     09/09/2024    K 4.6 09/09/2024     09/09/2024    CREATININE 1.06 (H) 09/09/2024    BUN 29 (H) 09/09/2024    CO2 27 09/09/2024    TSH 2.05 05/10/2023           Assessment   Problem List Items Addressed This Visit       Paroxysmal A-fib (CMS/[facility])     Follows with cardiology          Relevant Medications    metoprolol succinate XL (TOPROL-XL) 25 mg 24 hr tablet    flecainide (TAMBOCOR) 100 mg tablet    rivaroxaban (XARELTO) 20 mg tablet    Obesity, morbid (CMS/[facility])     chronic          Other Visit Diagnoses       Upper back pain on left side    -  Primary   TTP over trapezius muscle  Possible she aggravated upper back due to sleeping " positions/holding heating pad on lower back over the past week  She does have radiation into her left forearm hand  Will have her trial gabapentin at night  Continue tylenol prn  Heating pad  Avoid NSAIDs/steroids- she is on xarelto and baby aspirin  Follow up with Dr Mancini if symptoms worsen or persist.  ER precautions                   NIKHIL Mahmood  2/17/2025          [1]   Current Outpatient Medications on File Prior to Visit   Medication Sig Dispense Refill    acetaminophen (TYLENOL EXTRA STRENGTH) 500 mg tablet Take 500 mg by mouth daily.      albuterol HFA 90 mcg/actuation inhaler Inhale 2 puffs 4 (four) times a day as needed for shortness of breath or wheezing. 8.5 g 0    aspirin 81 mg chewable tablet Take 81 mg by mouth daily.      atorvastatin (LIPITOR) 40 mg tablet Take 40 mg by mouth every evening.        dilTIAZem CD (CARDIZEM CD) 120 mg 24 hr capsule Take 120 mg by mouth daily.      flecainide (TAMBOCOR) 100 mg tablet Take 100 mg by mouth 2 (two) times a day.      losartan (COZAAR) 50 mg tablet Take 50 mg by mouth nightly.        metoprolol succinate XL (TOPROL-XL) 25 mg 24 hr tablet Metoprolol Succinate ER 25 MG Oral Tablet Extended Release 24 Hour QTY: 30 tablet Days: 30 Refills: 5  Written: 04/19/24 Patient Instructions: as needed for significant palpitations      rivaroxaban (XARELTO) 20 mg tablet Take 20 mg by mouth daily with dinner.      sertraline (ZOLOFT) 50 mg tablet Take 1 tablet (50 mg total) by mouth every morning. 90 tablet 0    spironolactone (ALDACTONE) 50 mg tablet Take 50 mg by mouth daily.      rivaroxaban (XARELTO) 20 mg tablet Take 1 tablet (20 mg total) by mouth daily with dinner. HOLD until 10/9. Can start taking on 10/10 30 tablet 0     No current facility-administered medications on file prior to visit.

## 2025-02-17 NOTE — PATIENT INSTRUCTIONS
Start gabapentin at night- this can make you drowsy   Continue tylenol as needed   Heating pad to area     Follow up with Dr Mancini if not improved

## 2025-02-19 ENCOUNTER — APPOINTMENT (EMERGENCY)
Dept: RADIOLOGY | Facility: HOSPITAL | Age: 70
End: 2025-02-19
Payer: MEDICARE

## 2025-02-19 ENCOUNTER — HOSPITAL ENCOUNTER (EMERGENCY)
Facility: HOSPITAL | Age: 70
Discharge: HOME | End: 2025-02-19
Attending: STUDENT IN AN ORGANIZED HEALTH CARE EDUCATION/TRAINING PROGRAM | Admitting: STUDENT IN AN ORGANIZED HEALTH CARE EDUCATION/TRAINING PROGRAM
Payer: MEDICARE

## 2025-02-19 VITALS
DIASTOLIC BLOOD PRESSURE: 84 MMHG | SYSTOLIC BLOOD PRESSURE: 158 MMHG | TEMPERATURE: 97.3 F | HEIGHT: 66 IN | RESPIRATION RATE: 18 BRPM | OXYGEN SATURATION: 98 % | BODY MASS INDEX: 35.03 KG/M2 | HEART RATE: 64 BPM | WEIGHT: 218 LBS

## 2025-02-19 DIAGNOSIS — M54.6 ACUTE LEFT-SIDED THORACIC BACK PAIN: Primary | ICD-10-CM

## 2025-02-19 LAB
ALBUMIN SERPL-MCNC: 4 G/DL (ref 3.5–5.7)
ALP SERPL-CCNC: 65 IU/L (ref 34–125)
ALT SERPL-CCNC: 27 IU/L (ref 7–52)
ANION GAP SERPL CALC-SCNC: 7 MEQ/L (ref 3–15)
AST SERPL-CCNC: 22 IU/L (ref 13–39)
BASOPHILS # BLD: 0.03 K/UL (ref 0.01–0.1)
BASOPHILS NFR BLD: 0.4 %
BILIRUB SERPL-MCNC: 0.5 MG/DL (ref 0.3–1.2)
BUN SERPL-MCNC: 22 MG/DL (ref 7–25)
CALCIUM SERPL-MCNC: 9.4 MG/DL (ref 8.6–10.3)
CHLORIDE SERPL-SCNC: 104 MEQ/L (ref 98–107)
CO2 SERPL-SCNC: 27 MEQ/L (ref 21–31)
CREAT SERPL-MCNC: 0.9 MG/DL (ref 0.6–1.2)
DIFFERENTIAL METHOD BLD: ABNORMAL
EGFRCR SERPLBLD CKD-EPI 2021: >60 ML/MIN/1.73M*2
EOSINOPHIL # BLD: 0.11 K/UL (ref 0.04–0.36)
EOSINOPHIL NFR BLD: 1.6 %
ERYTHROCYTE [DISTWIDTH] IN BLOOD BY AUTOMATED COUNT: 13.5 % (ref 11.7–14.4)
GLUCOSE SERPL-MCNC: 93 MG/DL (ref 70–99)
HCT VFR BLD AUTO: 35 % (ref 35–45)
HGB BLD-MCNC: 11.4 G/DL (ref 11.8–15.7)
IMM GRANULOCYTES # BLD AUTO: 0.02 K/UL (ref 0–0.08)
IMM GRANULOCYTES NFR BLD AUTO: 0.3 %
LYMPHOCYTES # BLD: 2.3 K/UL (ref 1.2–3.5)
LYMPHOCYTES NFR BLD: 33.2 %
MCH RBC QN AUTO: 30 PG (ref 28–33.2)
MCHC RBC AUTO-ENTMCNC: 32.6 G/DL (ref 32.2–35.5)
MCV RBC AUTO: 92.1 FL (ref 83–98)
MONOCYTES # BLD: 0.78 K/UL (ref 0.28–0.8)
MONOCYTES NFR BLD: 11.3 %
NEUTROPHILS # BLD: 3.68 K/UL (ref 1.7–7)
NEUTS SEG NFR BLD: 53.2 %
NRBC BLD-RTO: 0 %
PLATELET # BLD AUTO: 195 K/UL (ref 150–369)
PMV BLD AUTO: 9.7 FL (ref 9.4–12.3)
POTASSIUM SERPL-SCNC: 4.1 MEQ/L (ref 3.5–5.1)
PROT SERPL-MCNC: 7.7 G/DL (ref 6–8.2)
RBC # BLD AUTO: 3.8 M/UL (ref 3.93–5.22)
SODIUM SERPL-SCNC: 138 MEQ/L (ref 136–145)
TROPONIN I SERPL HS-MCNC: 4 PG/ML
WBC # BLD AUTO: 6.92 K/UL (ref 3.8–10.5)

## 2025-02-19 PROCEDURE — 72070 X-RAY EXAM THORAC SPINE 2VWS: CPT

## 2025-02-19 PROCEDURE — 71046 X-RAY EXAM CHEST 2 VIEWS: CPT

## 2025-02-19 PROCEDURE — 85025 COMPLETE CBC W/AUTO DIFF WBC: CPT

## 2025-02-19 PROCEDURE — 84484 ASSAY OF TROPONIN QUANT: CPT

## 2025-02-19 PROCEDURE — 80053 COMPREHEN METABOLIC PANEL: CPT

## 2025-02-19 PROCEDURE — 63700000 HC SELF-ADMINISTRABLE DRUG

## 2025-02-19 PROCEDURE — 99283 EMERGENCY DEPT VISIT LOW MDM: CPT | Mod: 25

## 2025-02-19 PROCEDURE — 93005 ELECTROCARDIOGRAM TRACING: CPT

## 2025-02-19 PROCEDURE — 36415 COLL VENOUS BLD VENIPUNCTURE: CPT

## 2025-02-19 RX ORDER — LIDOCAINE 560 MG/1
1 PATCH PERCUTANEOUS; TOPICAL; TRANSDERMAL ONCE
Status: DISCONTINUED | OUTPATIENT
Start: 2025-02-19 | End: 2025-02-19 | Stop reason: HOSPADM

## 2025-02-19 RX ORDER — DIAZEPAM 2 MG/1
2 TABLET ORAL EVERY 6 HOURS PRN
Qty: 6 TABLET | Refills: 0 | Status: SHIPPED | OUTPATIENT
Start: 2025-02-19 | End: 2025-02-20 | Stop reason: ALTCHOICE

## 2025-02-19 RX ORDER — DIAZEPAM 5 MG/1
5 TABLET ORAL ONCE
Status: COMPLETED | OUTPATIENT
Start: 2025-02-19 | End: 2025-02-19

## 2025-02-19 RX ADMIN — LIDOCAINE 4% 1 PATCH: 40 PATCH TOPICAL at 17:47

## 2025-02-19 RX ADMIN — DIAZEPAM 5 MG: 5 TABLET ORAL at 17:47

## 2025-02-19 ASSESSMENT — ENCOUNTER SYMPTOMS
WEAKNESS: 0
NUMBNESS: 0
BACK PAIN: 1
NAUSEA: 0
SHORTNESS OF BREATH: 0
VOMITING: 0
ABDOMINAL PAIN: 0

## 2025-02-19 NOTE — ED PROVIDER NOTES
Emergency Medicine Note  HPI   HISTORY OF PRESENT ILLNESS     Barbara Landeros is a 69 y.o. female with past medical history of A-fib on Eliquis, asthma, hypertension, hyperlipidemia presenting to the emergency department for evaluation of left upper back pain.  Patient reports several days ago developing pain to her left upper back that feels like a spasm and tightening of her muscles.  She reports a few weeks ago she did develop lower back pain and was favoring her one side.  She reports taking Tylenol and using a Tylenol topical cream without any relief.  She states that she saw her primary care provider for this and she was prescribed gabapentin which patient reports also having no improvement of her pain.  She states the pain is not pleuritic.  She reports the pain also now radiated to her lower arm skipping her upper arm and describes it as a achy sensation.  She denies any injury or trauma, chest pain, shortness of breath, leg swelling          Patient History   PAST HISTORY     Reviewed from Nursing Triage:       Past Medical History:   Diagnosis Date    A-fib (CMS/[facility])     Anxiety     Arthritis     Asthma     H. pylori infection     treated with prev pac     Heart palpitations     Hypertension     Lipid disorder     Pericarditis     hospitalized 3-4 days, followed by Dr Mathur       Past Surgical History   Procedure Laterality Date    Cardioversion  2020    CARDIOVERSION EXTERNAL N/A 5/15/2020    Performed by Claribel Miranda MD at [facility] GI     section      ,     Colonoscopy      Esophagogastroduodenoscopy      Joint replacement Right 2014    hip replacement    Joint replacement Left     hip replacement    Knee arthroplasty Left     Left Total Hip Arthroplasty Left 2018    Performed by Sandeep Mckee MD at [facility] [place]    LEFT TOTAL KNEE ARTHROPLASTY Left 2020    Performed by Sandeep Mckee MD at [facility] [place]    Lung surgery Left     pleural fluid    Skin biopsy      nose,  BCC    Tonsillectomy  1958    [place] tooth extraction         Family History   Problem Relation Name Age of Onset    Hypertension Biological Mother      Heart disease Biological Mother      Heart attack Biological Mother      Heart disease Biological Father  56    Hyperlipidemia Biological Father      Hypertension Biological Sister      Hyperlipidemia Biological Sister      Hypertension Biological Sister      Hyperlipidemia Biological Sister      Breast cancer Neg Hx      Colon cancer Neg Hx         Social History     Tobacco Use    Smoking status: Former     Current packs/day: 0.00     Types: Cigarettes     Quit date:      Years since quittin.1    Smokeless tobacco: Never   Vaping Use    Vaping status: Never Used   Substance Use Topics    Alcohol use: Not Currently     Comment: occas    Drug use: No         Review of Systems   REVIEW OF SYSTEMS     Review of Systems   HENT:  Negative for congestion.    Respiratory:  Negative for shortness of breath.    Cardiovascular:  Negative for chest pain.   Gastrointestinal:  Negative for abdominal pain, nausea and vomiting.   Musculoskeletal:  Positive for back pain.   Neurological:  Negative for weakness and numbness.         VITALS     ED Vitals      Date/Time Temp Pulse Resp BP SpO2 [facility]   25 -- 64 18 158/84 98 % GDD   25 1739 -- 62 20 172/82 98 % PC   25 1450 36.3 °C (97.3 °F) 65 18 161/67 99 % JLG          Pulse Ox %: 99 % (25 1450)  Pulse Ox Interpretation: Normal (25 1450)  Heart Rate: 63 (25 1450)  Rhythm Strip Interpretation: Normal Sinus Rhythm (25 1450)     Physical Exam   PHYSICAL EXAM     Physical Exam  Constitutional:       General: She is not in acute distress.  HENT:      Head: Normocephalic and atraumatic.   Cardiovascular:      Rate and Rhythm: Normal rate.   Pulmonary:      Effort: Pulmonary effort is normal.      Breath sounds: Normal breath sounds.   Abdominal:      General: There is no distension.       Palpations: Abdomen is soft.      Tenderness: There is no abdominal tenderness. There is no guarding.   Musculoskeletal:         General: Normal range of motion.      Cervical back: Normal range of motion and neck supple. No tenderness.      Thoracic back: Normal range of motion.      Lumbar back: Normal.        Back:    Skin:     General: Skin is warm and dry.   Neurological:      Mental Status: She is alert and oriented to person, place, and time.           PROCEDURES     Procedures     DATA     Results       Procedure Component Value Units Date/Time    HS Troponin (with 2 hour reflex) [[id number]]  (Normal) Collected: 02/19/25 1744    Specimen: Blood, Venous Updated: 02/19/25 1845     High Sens Troponin I 4.0 pg/mL     Comprehensive metabolic panel [[id number]]  (Normal) Collected: 02/19/25 1744    Specimen: Blood, Venous Updated: 02/19/25 1839     Sodium 138 mEQ/L      Potassium 4.1 mEQ/L      Comment: Results obtained on plasma. Plasma Potassium values may be up to 0.4 mEQ/L less than serum values. The differences may be greater for patients with high platelet or white cell counts.        Chloride 104 mEQ/L      CO2 27 mEQ/L      BUN 22 mg/dL      Creatinine 0.9 mg/dL      Glucose 93 mg/dL      Calcium 9.4 mg/dL      AST (SGOT) 22 IU/L      ALT (SGPT) 27 IU/L      Alkaline Phosphatase 65 IU/L      Total Protein 7.7 g/dL      Comment: Test performed on plasma which typically contains approximately 0.4 g/dL more protein than serum.        Albumin 4.0 g/dL      Bilirubin, Total 0.5 mg/dL      eGFR >60.0 mL/min/1.73m*2      Comment: Calculation based on the Chronic Kidney Disease Epidemiology Collaboration (CKD-EPI) equation refit without adjustment for race.        Anion Gap 7 mEQ/L     CBC and differential [[id number]]  (Abnormal) Collected: 02/19/25 1744    Specimen: Blood, Venous Updated: 02/19/25 1814     WBC 6.92 K/uL      RBC 3.80 M/uL      Hemoglobin 11.4 g/dL      Hematocrit 35.0 %      MCV 92.1 fL      MCH  30.0 pg      MCHC 32.6 g/dL      RDW 13.5 %      Platelets 195 K/uL      MPV 9.7 fL      Differential Type Auto     nRBC 0.0 %      Immature Granulocytes 0.3 %      Neutrophils 53.2 %      Lymphocytes 33.2 %      Monocytes 11.3 %      Eosinophils 1.6 %      Basophils 0.4 %      Immature Granulocytes, Absolute 0.02 K/uL      Neutrophils, Absolute 3.68 K/uL      Lymphocytes, Absolute 2.30 K/uL      Monocytes, Absolute 0.78 K/uL      Eosinophils, Absolute 0.11 K/uL      Basophils, Absolute 0.03 K/uL             Imaging Results              X-RAY CHEST 2 VIEWS (Final result)  Result time 02/19/25 18:16:07      Final result                   Impression:    IMPRESSION:  No evidence of acute cardiopulmonary disease  No evidence of a thoracic spine compression deformity    COMMENT:  PA and lateral views of the chest were performed. Pericardial calcification.  Cardiac size normal. Atheromatous and atherosclerotic changes of the imaged  portions of the thoracic aorta. No evidence of a pleural effusion, pneumothorax  or pneumomediastinum. Lungs are clear.    3 views of thoracic spine performed and compared with October 15, 2024  examination. Multilevel degenerative disc disease. Normal thoracic kyphosis,  alignment and vertebral body heights.               Narrative:    CLINICAL HISTORY: Thoracic pain left sided                        Preliminary Interpretation    No acute disease interpreted by me Dr. Saenz                                     X-RAY THORACIC SPINE 2 VIEWS (Final result)  Result time 02/19/25 18:16:07      Final result                   Impression:    IMPRESSION:  No evidence of acute cardiopulmonary disease  No evidence of a thoracic spine compression deformity    COMMENT:  PA and lateral views of the chest were performed. Pericardial calcification.  Cardiac size normal. Atheromatous and atherosclerotic changes of the imaged  portions of the thoracic aorta. No evidence of a pleural effusion, pneumothorax  or  pneumomediastinum. Lungs are clear.    3 views of thoracic spine performed and compared with October 15, 2024  examination. Multilevel degenerative disc disease. Normal thoracic kyphosis,  alignment and vertebral body heights.               Narrative:    CLINICAL HISTORY: Thoracic pain left sided                                      ECG 12 lead   Independent Interpretation by ED Provider   Normal sinus rhythm at 61 bpm with first-degree AV block interpreted by me Dr. Saenz          Scoring tools                                  ED Course & MDM   MDM / ED COURSE / CLINICAL IMPRESSION / DISPO     Medical Decision Making  Amount and/or Complexity of Data Reviewed  Labs: ordered.  Radiology: ordered and independent interpretation performed.  ECG/medicine tests: ordered and independent interpretation performed.    Risk  OTC drugs.  Prescription drug management.        ED Course as of 02/20/25 0029 Wed Feb 19, 2025 1923 Patient reported to follow-up with primary care provider for which she already has an appointment for tomorrow.  She was instructed to take Tylenol as directed on the bottle, use lidocaine patches as directed on the packaging and Valium as prescribed.  Urine has been ordered made aware that this can make her feel drowsy and can increase risk of falls.  Patient advised to be extra precautions. Provided return precautions and agreeable to plan [VL]      ED Course User Index  [VL] Orin Bowen PA C     Clinical Impression      Acute left-sided thoracic back pain     _________________       ED Disposition   Discharge                       Orin Boewn PA C  02/20/25 0029

## 2025-02-19 NOTE — ED ATTESTATION NOTE
Physician Attestation:      I have personally seen and examined the patient, participated in the management, and agree with the findings in the above note except as where stated.       I have personally performed the key components of the encounter and provided a substantive portion of the care and medical decision making.     The Physician Assistant and I discussed  the case, workup, and disposition.          Chief Complaint  Chief Complaint   Patient presents with    Neck Pain          My focused history, examination, assessment, and plan of care is as follows:        History  69-year-old female past medical history significant for A-fib on Eliquis, asthma, pretension hyperlipidemia, CAD presenting for evaluation of left upper back pain that started a few days ago.  Feels muscular she has been taking Tylenol and gabapentin that was prescribed by her PCP.  Pain is nonpleuritic.  Denies shortness of breath, nausea, vomiting, dizziness, abdominal pain.  Reports that the pain started radiating to her left lower arm.     Physical  Vital signs reviewed   Awake alert resting comfortably  Heart regular rate rhythm normal S1-S2  Lungs clear auscultation bilaterally  Abdomen soft nontender  No reproducible scapular tenderness, no rash          MDM / Plan  -Patient historian/Independent historians: Patient and   -Prior records reviewed: Notes  -Plan: Labs including cardiac workup, chest x-ray, will treat with muscle relaxer and lidocaine patch     -The patient's chief complaint is an acute problem.     *Refer to ED Workup tab and PA chart for further documentation.               Dena Saenz MD  02/19/25 3995

## 2025-02-20 ENCOUNTER — OFFICE VISIT (OUTPATIENT)
Dept: FAMILY MEDICINE | Facility: CLINIC | Age: 70
End: 2025-02-20
Payer: MEDICARE

## 2025-02-20 VITALS
HEART RATE: 75 BPM | HEIGHT: 66 IN | TEMPERATURE: 97.8 F | OXYGEN SATURATION: 96 % | DIASTOLIC BLOOD PRESSURE: 78 MMHG | SYSTOLIC BLOOD PRESSURE: 134 MMHG | WEIGHT: 218 LBS | BODY MASS INDEX: 35.03 KG/M2

## 2025-02-20 DIAGNOSIS — Z78.0 ASYMPTOMATIC MENOPAUSAL STATE: ICD-10-CM

## 2025-02-20 DIAGNOSIS — Z23 NEED FOR PNEUMOCOCCAL 20-VALENT CONJUGATE VACCINATION: ICD-10-CM

## 2025-02-20 DIAGNOSIS — M54.9 UPPER BACK PAIN ON LEFT SIDE: ICD-10-CM

## 2025-02-20 DIAGNOSIS — Z00.00 MEDICARE ANNUAL WELLNESS VISIT, SUBSEQUENT: Primary | ICD-10-CM

## 2025-02-20 LAB
ATRIAL RATE: 61
P AXIS: 43
PR INTERVAL: 220
QRS DURATION: 86
QT INTERVAL: 396
QTC CALCULATION(BAZETT): 398
R AXIS: -12
T WAVE AXIS: -1
VENTRICULAR RATE: 61

## 2025-02-20 PROCEDURE — G0439 PPPS, SUBSEQ VISIT: HCPCS

## 2025-02-20 PROCEDURE — G0009 ADMIN PNEUMOCOCCAL VACCINE: HCPCS

## 2025-02-20 PROCEDURE — 90677 PCV20 VACCINE IM: CPT

## 2025-02-20 PROCEDURE — 93010 ELECTROCARDIOGRAM REPORT: CPT | Performed by: INTERNAL MEDICINE

## 2025-02-20 RX ORDER — TIZANIDINE 2 MG/1
2 TABLET ORAL EVERY 8 HOURS PRN
Qty: 30 TABLET | Refills: 0 | Status: SHIPPED | OUTPATIENT
Start: 2025-02-20 | End: 2025-03-06

## 2025-02-20 ASSESSMENT — MINI COG
COMPLETED: YES
TOTAL SCORE: 5

## 2025-02-20 ASSESSMENT — PATIENT HEALTH QUESTIONNAIRE - PHQ9: SUM OF ALL RESPONSES TO PHQ9 QUESTIONS 1 & 2: 0

## 2025-02-20 NOTE — PROGRESS NOTES
Subjective     Barbara Landeros is a 69 y.o. female who presents for a subsequent annual wellness visit.     Patient Care Team:  Kavitha Mancini MD as PCP - General (Family Medicine)  Kevan, Arturo IRWIN MD as Cardiologist (Cardiology)      Seen by me for back pain 2 days ago,   Pain persisted/worsening last night so went to ER    Had cardiac work up that was negative  Thoracic XR -Multilevel degenerative disc disease. Normal thoracic kyphosis,  alignment and vertebral body heights.  Given valium, lidocaine patch and was discharged  She states pain is still persistent, worse with activity. Gabapentin did provide some relief and able to sleep    Comprehensive Medical and Social History  Patient Active Problem List   Diagnosis    Essential hypertension    Dyslipidemia    CAD (coronary artery disease)    Pericarditis    Degenerative joint disease of left hip    Paroxysmal A-fib (CMS/[facility])    Left knee DJD    Irving's reflex positive    Cervical spondylosis    Obesity, morbid (CMS/HCC)     Past Medical History:   Diagnosis Date    A-fib (CMS/HCC)     Anxiety     Arthritis     Asthma     H. pylori infection     treated with prev pac     Heart palpitations     Hypertension     Lipid disorder     Pericarditis     hospitalized 3-4 days, followed by Dr Mathur     Past Surgical History   Procedure Laterality Date    Cardioversion  2020    CARDIOVERSION EXTERNAL N/A 5/15/2020    Performed by Claribel Miranda MD at [facility]     section      ,     Colonoscopy      Esophagogastroduodenoscopy      Joint replacement Right 2014    hip replacement    Joint replacement Left     hip replacement    Knee arthroplasty Left     Left Total Hip Arthroplasty Left 2018    Performed by Sandeep Mckee MD at [facility]    LEFT TOTAL KNEE ARTHROPLASTY Left 2020    Performed by Sandeep Mckee MD at [facility]    Lung surgery Left     pleural fluid    Skin biopsy      nose, BCC    Tonsillectomy       Murphy tooth extraction       Allergies   Allergen Reactions    Bystolic [Nebivolol]      Triggers asthma attack    Prevpac [Vpwqqrsz-Brktcbhdrzu-Rrbnsyopm] Other (see comments)     Flu symptoms    Amoxicillin     Clarithromycin     Medroxyprogesterone      Current Outpatient Medications   Medication Sig Dispense Refill    acetaminophen (TYLENOL EXTRA STRENGTH) 500 mg tablet Take 500 mg by mouth daily.      albuterol HFA 90 mcg/actuation inhaler Inhale 2 puffs 4 (four) times a day as needed for shortness of breath or wheezing. 8.5 g 0    aspirin 81 mg chewable tablet Take 81 mg by mouth daily.      atorvastatin (LIPITOR) 40 mg tablet Take 40 mg by mouth every evening.        dilTIAZem CD (CARDIZEM CD) 120 mg 24 hr capsule Take 120 mg by mouth daily.      flecainide (TAMBOCOR) 100 mg tablet Take 100 mg by mouth 2 (two) times a day.      gabapentin (NEURONTIN) 300 mg capsule Take 1 capsule (300 mg total) by mouth nightly. 30 capsule 0    losartan (COZAAR) 50 mg tablet Take 50 mg by mouth nightly.        metoprolol succinate XL (TOPROL-XL) 25 mg 24 hr tablet Metoprolol Succinate ER 25 MG Oral Tablet Extended Release 24 Hour QTY: 30 tablet Days: 30 Refills: 5  Written: 04/19/24 Patient Instructions: as needed for significant palpitations      rivaroxaban (XARELTO) 20 mg tablet Take 20 mg by mouth daily with dinner.      sertraline (ZOLOFT) 50 mg tablet Take 1 tablet (50 mg total) by mouth every morning. 90 tablet 0    spironolactone (ALDACTONE) 50 mg tablet Take 50 mg by mouth daily.      tiZANidine (ZANAFLEX) 2 mg tablet Take 1 tablet (2 mg total) by mouth every 8 (eight) hours as needed for muscle spasms for up to 14 days. 30 tablet 0    rivaroxaban (XARELTO) 20 mg tablet Take 1 tablet (20 mg total) by mouth daily with dinner. HOLD until 10/9. Can start taking on 10/10 30 tablet 0     No current facility-administered medications for this visit.     Social History     Tobacco Use    Smoking status: Former     Current  "packs/day: 0.00     Types: Cigarettes     Quit date:      Years since quittin.1    Smokeless tobacco: Never   Vaping Use    Vaping status: Never Used   Substance Use Topics    Alcohol use: Not Currently     Comment: occas    Drug use: No     Family History   Problem Relation Name Age of Onset    Hypertension Biological Mother      Heart disease Biological Mother      Heart attack Biological Mother      Heart disease Biological Father  56    Hyperlipidemia Biological Father      Hypertension Biological Sister      Hyperlipidemia Biological Sister      Hypertension Biological Sister      Hyperlipidemia Biological Sister      Breast cancer Neg Hx      Colon cancer Neg Hx         Objective   Vitals  Vitals:    25 1242   BP: 134/78   BP Location: Right upper arm   Patient Position: Sitting   Pulse: 75   Temp: 36.6 °C (97.8 °F)   TempSrc: Temporal   SpO2: 96%   Weight: 98.9 kg (218 lb)   Height: 1.676 m (5' 6\")     Body mass index is 35.19 kg/m².    Advanced Care Plan  Does patient have advance directive?: Yes       Patient has Advance Directive: Advance Directive is NOT in chart, requested to bring in                             PHQ  Will the patient answer the depression questions?: Yes   Little interest or pleasure in doing things: Not at all   Feeling down, depressed, or hopeless: Not at all   Depression Risk: 0                                             Mini Cog  Completed: Yes  Score: 5  Result: Negative      Get Up and Go  Result: Pass    STEADI Falls Risk  One or more falls in the last year: No           Has trouble stepping up onto a curb: No   Advised to use a cane or walker to get around safely: No   Often has to rush to the toilet: No   Feels unsteady when walking: Yes (balance not as good as used to be)   Has lost some feeling in feet: No   Often feels sad or depressed: No   Steadies self on furniture while walking at home: No   Takes medication that makes him/her feel lightheaded or more " tired than usual: No   Worried about falling: No   Takes medicine to sleep or improve mood: Yes   Needs to push with hands when rising from a chair: No   Falls screen completed: Yes     Hearing and Vision Screening  No results found.  See HRA for relevant hearing screening response.    Diet and Exercise  Do you exercise regularly?: No   Do you feel that your diet is healthy?: No     Assessment/Plan   Diagnoses and all orders for this visit:    Medicare annual wellness visit, subsequent (Primary)  Annual Wellness visit completed; assessed for health risk factors.   PPPS form completed and reviewed with patient. See attached to encounter.    COUNSELING -   Counseled on general health maintenance measures: staying active and eating well.  Continue regular follow-up with PCP and specialists as recommended.   Discussed fall precautions & safety in the home  Discussed Advanced Care Planning      SCREENING -    - Hepatitis C. Screening: completed 2024 negative  - DEXA screen: due rx provided  - Mammogram: UTD 1/2025  - Colorectal Cancer screening: UTD 2022 with [facility], will request record  - Lung Cancer screening: n/a quit > 20 yr ago   - Immunizations: prevnar 20 today. Flu, shingrix UTD  Due for tdap, encouraged to get at pharmacy. Declines covid.       Asymptomatic menopausal state  -     DEXA BONE DENSITY; Future    Need for pneumococcal 20-valent conjugate vaccination  -     DEXA BONE DENSITY; Future    Upper back pain on left side  Discussed with pcp  Recommend stop valium  Rx provided for tizanidine 2 mg prn - cautioned it can make her drowsy and not to take with gabapentin  Tylenol prn, lidocaine patch prn  Physical therapy  She is traveling next week to [place] for 2 months - advised if persistent worsening pain to see ortho/spine  -     Ambulatory referral to Physical Therapy; Future    Other orders  -     Pneumococcal conjugate vaccine 20-valent (PREVNAR) IM  -     tiZANidine (ZANAFLEX) 2 mg tablet; Take 1 tablet  (2 mg total) by mouth every 8 (eight) hours as needed for muscle spasms for up to 14 days.                Colonoscopy done jan 2022 -10 yr follow up    See Patient Instructions (the written plan) which was given to the patient for PPPS and health risk factors with interventions.

## 2025-02-20 NOTE — DISCHARGE INSTRUCTIONS
Follow-up with your primary care provider tomorrow.  You can take Tylenol as directed on the packaging.  You can use lidocaine patches as directed on the packaging.  You can take Flexeril as needed for muscle spasms.  Return to the emergency department for new or worsening symptoms

## 2025-02-20 NOTE — PATIENT INSTRUCTIONS
May take tizanidine as needed every 8 hours - this can make you drowsy.  Do not take tizanidine with gabapentin  Tylenol as needed  Lidocaine patches  Start physical therapy     [facility]   [address]  [place]  [phone]    [facility] [place] [address]  [phone]      If not better while in [place] - could see orthopedics or spine specialist there                       Your Personalized Prevention Plan Services (PPPS)    Preventive Services Checklist (Assumes Average Risk Unless Otherwise Noted):    Health Maintenance Topics with due status: Overdue       Topic Date Due    DEXA Scan Never done    Colorectal Cancer Screening Never done    DTaP, Tdap, and Td Vaccines Never done    RSV Vaccine Never done     Health Maintenance Topics with due status: Postponed       Topic Postponed Until    COVID-19 Vaccine 02/20/2026 (Originally 9/1/2024)     Health Maintenance Topics with due status: Not Due       Topic Last Completion Date    Breast Cancer Screening 01/02/2025    Medicare Annual Wellness Visit 02/20/2025    Depression Screening 02/20/2025    Falls Risk Screening 02/20/2025     Health Maintenance Topics with due status: Completed       Topic Last Completion Date    Zoster Vaccine 12/31/2019    Hepatitis C Screening 09/09/2024    Influenza Vaccine 09/10/2024    Pneumococcal (65 years and older) 02/20/2025     Health Maintenance Topics with due status: Aged Out       Topic Date Due    Meningococcal ACWY Aged Out    RSV <20 months Aged Out    HIB Vaccines Aged Out    Hepatitis B Vaccines Aged Out    IPV Vaccines Aged Out    Meningococcal B Aged Out    HPV Vaccines Aged Out       You May Be Eligible for These Additional Preventive Services   (Assumes Average Risk Unless Otherwise Noted)  Diabetes Screening Any 1 risk factor: hypertension, dyslipidemia, obesity, high glucose; or Any 2 risk factors: >=66yo, overweight, family history diabetes (covered every 6 months)    Hepatitis C Screening Any 1 risk factor: 1) blood transfusion before 1992,   2) current or past injection drug use (annually for high risk; if born between [birth year]70-[birth year]76, see above for status).   Vaccine: Hepatitis B As necessary if at-risk: hemophilia, ESRD, diabetes, living with individual infected with hep B, healthcare worker with frequent contact with blood/bodily fluids (series covered once)   Sexually Transmitted Diseases (STDs) As necessary chlamydia, gonorrhea, syphilis, hepatitis B (covered annually)  HIV if any 1 risk factor present: 1) <14yo or >66yo and at increased risk or 2) 15-66yo and ask for it (covered annually)   Lung Cancer Screening Low dose chest CT if all three risk factors: 1) 50-78yo, 2) smoker or quit within last 15y, 3) >=20 pack years (covered annually).  No results found for this or any previous visit.       Cholesterol Screening Both risk factors: 1) >=21yo and 2)  increased risk coronary artery disease (covered every 5 years).   Breast Cancer Screening Covered once 35-40yo, annually >=41yo (if >=51yo, see above for status).       Health Risk Factors with Personalized Education:  ----------------------------------------------------------------------------------------------------------------------  Stress management:  Understanding Your Stress  Think about how you know when you’re stressed.  Think about how your thoughts or behaviors are different when you’re stressed.  Think about what triggers stress for you.  Think about how you handle stress, and whether you’re making unhealthy choices in response to stress (like smoking, drinking alcohol or overeating).  Managing Stress  Take a break from the stressful situation.  Reduce your stress levels by exercising, talking with family/friends, ensuring adequate sleep.  Consider meditation or yoga.  Make sure you plan time to do things you enjoy.  Let your PCP know if you’re having problems limiting your  stress.  ----------------------------------------------------------------------------------------------------------------------  Controlling Your Blood Pressure  Maintain a normal weight (body mass index between 18.5 and 24.9).  Eat more fruit, vegetables and low-fat dairy.  Eat less saturated fat and total fat.  Lower your sodium (salt) intake.  Try to stay under 1500 mg per day, but if you cannot get your intake to be that low, at least lower it by 1000 mg.  Stay active.  Try to get at least 90 to 150 minutes of exercise per week.  Try brisk walking, swimming, bicycling or dancing.  Limit alcohol intake.  When you do consume alcohol, drink no more than 1 drink per day.  If you have been prescribed medication, take it regularly and exactly as prescribed.  Let your PCP know if you have any problems or questions about your medication.  Check your blood pressure at home or at the store.  Write down your readings and share them with your PCP  ----------------------------------------------------------------------------------------------------------------------  Maintaining Strong Bones  Try to get at least 90 to 150 minutes of weight-bearing exercise per week.  Ensure intake of at least 1200mg of calcium per day.  Eat foods high in calcium like milk and other dairy, green vegetables, fruit, canned fish with soft and edible bones, nuts, calcium-set tofu.  Some foods are calcium-fortified, like bread, cereal, fruit juices and mineral water.  Help your body make vitamin D by getting 10-15 minutes per day of sunlight.    Ensure intake of at least 600IU of vitamin D per day.  Eat foods high in vitamin D like oily fish (salmon, sardines, mackerel) and eggs.  Some foods are fortified with vitamin D, like dairy and cereals.  Avoid high amounts of caffeine and salt, since they can cause the body to loose calcium.  Limit alcohol intake, since it is associated with weaker bones and is associated with falls and fractures.  Limit  intake of fizzy drinks.  ----------------------------------------------------------------------------------------------------------------------  Reducing Your Risk of Falls  Tell your PCP if any of your medications make you feel tired, dizzy, lightheaded or off-balance.  Maintain coordination, flexibility and balance by ensuring regular physical activity.  Limit alcohol intake to 1 drink per day.  Consider avoiding all alcohol intake.  Ensure good vision.  Visit an ophthalmologist or optometrist regularly for vision screening or to make sure your glasses / contact lens prescription is correct.  If you need glasses or contacts, wear them.  When you get new glasses or contacts, take time to get used to them.  Do not wear sunglasses or tinted lenses when indoors.  Ensure good hearing.  Have your hearing checked if you are having trouble hearing, or family and friends think you cannot hear them.  If you need a hearing aid, be sure it fits well and wear it.  Get enough rest.  Ensure about 7-9 hours of sleep every day.  Get up slowly from your bed or chairs.  Do not start walking until you are sure you feel steady.  Wear non-skid, rubber-soled, low-heeled shoes.  Do not walk in socks, or in shoes and slippers with smooth soles.  If your PCP or therapist recommends using a cane or walker, use it regularly.  Make your home safer.  Increase lighting throughout the house, especially at the top and bottom of stairs.  Ensure lighting is easily turned on when getting up in the middle of the night.  Make sure there are two secure rails on all stairs.  Install grab bars in the bathtub / shower and near the toilet.  Consider using a shower chair and / or a hand-held shower.  Spread sand or salt on icy surfaces.  Beware of wet surfaces, which can be icy.  Tell your PCP if you have fallen.

## 2025-04-28 ENCOUNTER — OFFICE VISIT (OUTPATIENT)
Dept: FAMILY MEDICINE | Facility: CLINIC | Age: 70
End: 2025-04-28
Payer: MEDICARE

## 2025-04-28 VITALS
BODY MASS INDEX: 35.36 KG/M2 | OXYGEN SATURATION: 97 % | TEMPERATURE: 97.5 F | HEIGHT: 66 IN | DIASTOLIC BLOOD PRESSURE: 80 MMHG | WEIGHT: 220 LBS | SYSTOLIC BLOOD PRESSURE: 138 MMHG | HEART RATE: 65 BPM

## 2025-04-28 DIAGNOSIS — I10 ESSENTIAL HYPERTENSION: ICD-10-CM

## 2025-04-28 DIAGNOSIS — I25.10 CORONARY ARTERY DISEASE INVOLVING NATIVE HEART WITHOUT ANGINA PECTORIS, UNSPECIFIED VESSEL OR LESION TYPE: ICD-10-CM

## 2025-04-28 DIAGNOSIS — H81.10 BENIGN PAROXYSMAL POSITIONAL VERTIGO, UNSPECIFIED LATERALITY: ICD-10-CM

## 2025-04-28 DIAGNOSIS — H53.9 VISION CHANGES: ICD-10-CM

## 2025-04-28 DIAGNOSIS — R94.31 ABNORMAL EKG: ICD-10-CM

## 2025-04-28 DIAGNOSIS — R52 PAIN: ICD-10-CM

## 2025-04-28 DIAGNOSIS — I48.0 PAROXYSMAL A-FIB (CMS/HCC): ICD-10-CM

## 2025-04-28 DIAGNOSIS — R29.898 RIGHT ARM WEAKNESS: Primary | ICD-10-CM

## 2025-04-28 PROBLEM — R29.2: Status: RESOLVED | Noted: 2024-09-05 | Resolved: 2025-04-28

## 2025-04-28 PROCEDURE — G8754 DIAS BP LESS 90: HCPCS | Performed by: FAMILY MEDICINE

## 2025-04-28 PROCEDURE — G8752 SYS BP LESS 140: HCPCS | Performed by: FAMILY MEDICINE

## 2025-04-28 PROCEDURE — 99214 OFFICE O/P EST MOD 30 MIN: CPT | Performed by: FAMILY MEDICINE

## 2025-04-28 ASSESSMENT — ENCOUNTER SYMPTOMS
HEADACHES: 0
EYE PAIN: 0
SHORTNESS OF BREATH: 0
FEVER: 0
CHILLS: 0
COUGH: 0
DIZZINESS: 0
PALPITATIONS: 0

## 2025-04-28 NOTE — ASSESSMENT & PLAN NOTE
Well controlled, continue current medications and lifestyle measures.    Orders:    Basic metabolic panel; Future

## 2025-04-28 NOTE — PROGRESS NOTES
"Subjective    Barbara Landeros is a 69 y.o. female presenting today for: Follow-up      HPI: 70yo female with h/o pAfib s/p cardioversion 2019, HTN, +CAC, HL, intermittent asthma, basal cell ca here for follow-up of multiple issues as listed below.    \"Cold\" for 2 weeks.   Scratchy throat, then dry cough, head cold.  She has not been taking any allergy medications. She hasn't had allergies since she was a kid.  Chronic itchy ears.     She had an episode of dizziness/imbalance after a URI in March. She went to urgent care. Told likely viral. Given a steroid burst which helped nasal congestion and dizziness.    She had an abnormal EKG at this point, too, which the urgent care was concerned about. She checked with her cardiologist and is scheduled for stress echo.      She saw neurosurgery re: abnormal neuro exam in setting of \"waves\" of pain in arms.  She is still having episodes. One that started in legs, went all the up to her arms and right arm felt heavy/numb. Took more \"effort\" for right arm to execute tasks.  Pain will last for a minute or two.  One more recently lasted 3-5 minutes.   Neurosurgery did not think this was cord related. Referred to neurology. She had to cancel her appt due to 's medical problem.     Health Maintenance Due   Topic Date Due    DEXA Scan  Never done    RSV Vaccine (1 - Risk 60-74 years 1-dose series) Never done     --    Patient Active Problem List   Diagnosis    Essential hypertension    Dyslipidemia    CAD (coronary artery disease)    Pericarditis    Degenerative joint disease of left hip    Paroxysmal A-fib (CMS/HCC)    Left knee DJD    Cervical spondylosis    Obesity, morbid (CMS/HCC)    Edema    Hip pain    Hyperlipemia    Pain    Right arm weakness          Current Outpatient Medications:     acetaminophen (TYLENOL EXTRA STRENGTH) 500 mg tablet, Take 500 mg by mouth daily., Disp: , Rfl:     albuterol HFA 90 mcg/actuation inhaler, Inhale 2 puffs 4 (four) times a day as needed " for shortness of breath or wheezing., Disp: 8.5 g, Rfl: 0    aspirin 81 mg chewable tablet, Take 81 mg by mouth daily., Disp: , Rfl:     atorvastatin (LIPITOR) 40 mg tablet, Take 40 mg by mouth every evening.  , Disp: , Rfl:     dilTIAZem CD (CARDIZEM CD) 120 mg 24 hr capsule, Take 120 mg by mouth 2 (two) times a day., Disp: , Rfl:     flecainide (TAMBOCOR) 100 mg tablet, Take 100 mg by mouth 2 (two) times a day., Disp: , Rfl:     losartan (COZAAR) 50 mg tablet, Take 50 mg by mouth 2 (two) times a day., Disp: , Rfl:     metoprolol succinate XL (TOPROL-XL) 25 mg 24 hr tablet, Metoprolol Succinate ER 25 MG Oral Tablet Extended Release 24 Hour QTY: 30 tablet Days: 30 Refills: 5  Written: 04/19/24 Patient Instructions: as needed for significant palpitations, Disp: , Rfl:     rivaroxaban (XARELTO) 20 mg tablet, Take 20 mg by mouth daily with dinner., Disp: , Rfl:     sertraline (ZOLOFT) 50 mg tablet, Take 1 tablet (50 mg total) by mouth every morning., Disp: 90 tablet, Rfl: 0    spironolactone (ALDACTONE) 50 mg tablet, Take 50 mg by mouth daily., Disp: , Rfl:     gabapentin (NEURONTIN) 300 mg capsule, Take 1 capsule (300 mg total) by mouth nightly., Disp: 30 capsule, Rfl: 0    rivaroxaban (XARELTO) 20 mg tablet, Take 1 tablet (20 mg total) by mouth daily with dinner. HOLD until 10/9. Can start taking on 10/10, Disp: 30 tablet, Rfl: 0     Allergies   Allergen Reactions    Bystolic [Nebivolol]      Triggers asthma attack    Prevpac [Mptjxtcl-Joanheolytj-Cqearfcop] Other (see comments)     Flu symptoms    Amoxicillin     Clarithromycin     Estrogens, Conjugated, Synthetic A     Medroxyprogesterone            Review of Systems   Constitutional:  Negative for chills and fever.   Eyes:  Positive for visual disturbance. Negative for pain.   Respiratory:  Negative for cough and shortness of breath.    Cardiovascular:  Negative for chest pain and palpitations.   Neurological:  Negative for dizziness and headaches.       "    Objective  Visit Vitals  /80 (BP Location: Left upper arm, Patient Position: Sitting)   Pulse 65   Temp 36.4 °C (97.5 °F) (Temporal)   Ht 1.676 m (5' 6\")   Wt 99.8 kg (220 lb)   SpO2 97%   BMI 35.51 kg/m²    Body mass index is 35.51 kg/m².    Wt Readings from Last 3 Encounters:   04/28/25 99.8 kg (220 lb)   02/20/25 98.9 kg (218 lb)   02/19/25 98.9 kg (218 lb)       BP Readings from Last 3 Encounters:   04/28/25 138/80   02/20/25 134/78   02/19/25 (!) 158/84            Physical Exam  Vitals reviewed.   Constitutional:       Appearance: Normal appearance. She is not ill-appearing.   HENT:      Head: Normocephalic and atraumatic.      Right Ear: External ear normal.      Left Ear: External ear normal.   Eyes:      Conjunctiva/sclera: Conjunctivae normal.      Pupils: Pupils are equal, round, and reactive to light.      Funduscopic exam:        Left eye: No papilledema.   Cardiovascular:      Rate and Rhythm: Normal rate and regular rhythm.      Heart sounds: No murmur heard.  Pulmonary:      Effort: Pulmonary effort is normal.      Breath sounds: Normal breath sounds. No wheezing, rhonchi or rales.   Skin:     General: Skin is warm and dry.   Neurological:      General: No focal deficit present.      Mental Status: She is alert and oriented to person, place, and time.   Psychiatric:         Mood and Affect: Mood normal.         Behavior: Behavior normal.              Laboratories        Imaging/Studies    MRI THORACIC SPINE WITHOUT CONTRAST [[id number]]  MRI LUMBAR SPINE WITHOUT CONTRAST [[id number]]  Status: Final result     PACS Images     Show images for MRI LUMBAR SPINE WITHOUT CONTRAST    Comments Released to [facility]     Add Comments   Seen     Study Result    Narrative & Impression   CLINICAL HISTORY: Thoracolumbar radiculopathy.     COMMENT:  TECHNIQUE: MRI of the thoracic and lumbar spine was performed without  intravenous contrast.  COMPARISON: Cervical MRI 9/6/2024.     Findings:     Anatomic alignment of " the thoracic spine. Probable lipid-poor intraosseous  venous vascular malformation in the T5 body. Thoracic vertebral bodies are  otherwise normal in signal and height. Multilevel disc desiccation with  preserved intervertebral disc heights. The thoracic spinal cord is normal in  size and signal.     There are small focal posterior disc protrusions greatest at the T5-T6, T6-T7,  and T8-T9 levels causing mild spinal canal stenosis, noting mild focal mass  effect with contour deformation of the right ventral cord at the T5-T6 level  without cord signal abnormality. No severe neural foraminal narrowing.     Anatomic alignment of the lumbar spine.  Vertebral bodies are normal in signal and height.  Diffuse desiccation with severe loss of height at T12-L1 and mild loss of height  at L4-L5.  The visualized conus is normal in size and signal, terminating at the L1 level.     T12-L1: Small diffuse disc bulge. Mild facet arthropathy. Minimal spinal canal  stenosis. Minimal bilateral neural foraminal narrowing.     L1-L2: No significant posterior disc abnormality, spinal canal stenosis, or  neural foraminal narrowing.     L2-L3: Small diffuse disc bulge. Trace spinal canal stenosis. Mild bilateral  neural foraminal narrowing.     L3-L4: Small diffuse disc bulge. Moderate facet arthropathy. Mild spinal canal  stenosis. Mild bilateral neural foraminal narrowing.     L4-L5: Small diffuse disc bulge. Severe facet arthropathy. Mild spinal canal  stenosis. Mild to moderate bilateral subarticular recess narrowing. Mild to  moderate bilateral neural foraminal narrowing.     L5-S1: No significant posterior disc abnormality. Severe facet arthropathy. No  spinal canal stenosis. Mild bilateral neural foraminal narrowing.     --  IMPRESSION:  1. Degenerative changes of the thoracolumbar spine, notable for a small disc  protrusion at T5-T6 contacting the right ventral cord without cord signal  abnormality.  2. No severe spinal canal  stenosis or neural foraminal narrowing in the thoracic  or lumbar levels.     MRI CERVICAL SPINE WITHOUT CONTRAST [[id number]]  Status: Edited Result - FINAL     PACS Images     Show images for MRI CERVICAL SPINE WITHOUT CONTRAST    Comments Released to [employer]     Add Comments   Seen     Addendum    Margret PAPPAS faxed the report and the results were transmitted electronically which  were received and acknowledged in [employer] Secure Chat by Kavitha Mancini MD at  4:45pm on 9/6/2024.   Addended by Ross Price MD on 9/6/2024 10:02 PM      Study Result    Narrative & Impression   CLINICAL HISTORY: R29.2: Abnormal reflex     PRIOR STUDY: None relevant     TECHNIQUE: Noncontrast MR cervical spine     COMMENT:  There is straightening the normal cervical lordosis.  The vertebral  bodies are maintained in height, signal and alignment.  There is disc  desiccation noted throughout.  The spinal cord is normal in signal and  morphology.     Unless otherwise noted there is no significant disc herniation, central or  foraminal narrowing.     C2-C3 shows facet hypertrophy.     C3-C4 shows a disc osteophyte complex, uncovertebral and facet hypertrophy  contributing to severe left and moderate right foraminal narrowing.  There is  mild central canal narrowing.     C4-C5 shows a disc osteophyte complex, uncovertebral and facet hypertrophy  contributing to mild bilateral foraminal narrowing and mild central canal  narrowing.     C5-C6 shows a disc osteophyte complex, uncovertebral and facet hypertrophy  contributing to moderate to severe bilateral foraminal narrowing and mild  central canal narrowing.     C6-C7 shows a disc osteophyte complex which contacts and deforms the ventral  spinal cord, uncovertebral and facet hypertrophy contributing to mild bilateral  foraminal narrowing and mild central canal narrowing.     C7-T1 shows a disc osteophyte complex and facet hypertrophy contributing to mild  bilateral foraminal narrowing.    "  T1-T2 shows a disc osteophyte complex and facet hypertrophy.     --  IMPRESSION: Multilevel spondylosis as described above in detail.             Assessment/Plan  Assessment & Plan  Right arm weakness  Eval for aneurysm given pulsatile vision changes.  ER warning precautions reviewed. Go to ER if symptoms last longer than a few minutes or progressive symptoms.  Has appt with eye doctor next week.  Orders:    MRI ANGIOGRAM HEAD WITH AND WITHOUT CONTRAST; Future    Benign paroxysmal positional vertigo, unspecified laterality  Symptoms resolving.       Abnormal EKG  Stress echo scheduled for later this week.       Coronary artery disease involving native heart without angina pectoris, unspecified vessel or lesion type  Stress echo scheduled for later this week.       Essential hypertension  Well controlled, continue current medications and lifestyle measures.    Orders:    Basic metabolic panel; Future    Paroxysmal A-fib (CMS/HCC)  Care per cardiology.       Pain  I don't know what to make of these \"waves\" of pain she has.  Checking brain imaging given focal neuro symptoms.  If non diagnostic, will have her see neurology.        Vision changes    Orders:    MRI ANGIOGRAM HEAD WITH AND WITHOUT CONTRAST; Future             Return in about 3 months (around 7/28/2025).     Kavitha Mancini MD      "

## 2025-04-28 NOTE — PATIENT INSTRUCTIONS
Zyrtec (cetirizine) pill and Flonase (fluticasone) nasal spray once a day every day (not as needed) for 2 weeks. Let me know if cough hasn't resolved.

## 2025-04-28 NOTE — ASSESSMENT & PLAN NOTE
Eval for aneurysm given pulsatile vision changes.  ER warning precautions reviewed. Go to ER if symptoms last longer than a few minutes or progressive symptoms.  Has appt with eye doctor next week.  Orders:    MRI ANGIOGRAM HEAD WITH AND WITHOUT CONTRAST; Future

## 2025-04-28 NOTE — ASSESSMENT & PLAN NOTE
"I don't know what to make of these \"waves\" of pain she has.  Checking brain imaging given focal neuro symptoms.  If non diagnostic, will have her see neurology.        "

## 2025-05-01 DIAGNOSIS — M16.12 PRIMARY OSTEOARTHRITIS OF LEFT HIP: Primary | ICD-10-CM

## 2025-05-01 RX ORDER — SERTRALINE HYDROCHLORIDE 50 MG/1
50 TABLET, FILM COATED ORAL EVERY MORNING
Qty: 90 TABLET | Refills: 3 | Status: SHIPPED | OUTPATIENT
Start: 2025-05-01

## 2025-05-05 LAB
BUN SERPL-MCNC: 23 MG/DL (ref 7–25)
BUN/CREAT SERPL: 21 (CALC) (ref 6–22)
CALCIUM SERPL-MCNC: 9.2 MG/DL (ref 8.6–10.4)
CHLORIDE SERPL-SCNC: 105 MMOL/L (ref 98–110)
CO2 SERPL-SCNC: 25 MMOL/L (ref 20–32)
CREAT SERPL-MCNC: 1.08 MG/DL (ref 0.6–1)
EGFRCR SERPLBLD CKD-EPI 2021: 55 ML/MIN/1.73M2
GLUCOSE SERPL-MCNC: 122 MG/DL (ref 65–99)
POTASSIUM SERPL-SCNC: 4.3 MMOL/L (ref 3.5–5.3)
SODIUM SERPL-SCNC: 139 MMOL/L (ref 135–146)

## 2025-05-06 ENCOUNTER — RESULTS FOLLOW-UP (OUTPATIENT)
Dept: FAMILY MEDICINE | Facility: CLINIC | Age: 70
End: 2025-05-06

## 2025-05-08 ENCOUNTER — HOSPITAL ENCOUNTER (OUTPATIENT)
Dept: RADIOLOGY | Age: 70
Discharge: HOME | End: 2025-05-08
Attending: FAMILY MEDICINE
Payer: MEDICARE

## 2025-05-08 VITALS — BODY MASS INDEX: 35.02 KG/M2 | WEIGHT: 217 LBS

## 2025-05-08 DIAGNOSIS — R29.898 RIGHT ARM WEAKNESS: ICD-10-CM

## 2025-05-08 DIAGNOSIS — H53.9 VISION CHANGES: ICD-10-CM

## 2025-05-08 RX ORDER — GADOBUTROL 604.72 MG/ML
9.8 INJECTION INTRAVENOUS
Status: COMPLETED | OUTPATIENT
Start: 2025-05-08 | End: 2025-05-08

## 2025-05-08 RX ADMIN — GADOBUTROL 9.8 ML: 604.72 INJECTION INTRAVENOUS at 13:47

## 2025-05-13 DIAGNOSIS — M47.812 CERVICAL SPONDYLOSIS: Primary | ICD-10-CM

## 2025-05-14 NOTE — TELEPHONE ENCOUNTER
Hermilo Funk I just faxed over the order for [facility]  Fax number [phone]

## 2025-06-18 ENCOUNTER — OFFICE VISIT (OUTPATIENT)
Dept: FAMILY MEDICINE | Facility: CLINIC | Age: 70
End: 2025-06-18
Payer: MEDICARE

## 2025-06-18 VITALS
HEIGHT: 66 IN | BODY MASS INDEX: 35.52 KG/M2 | TEMPERATURE: 97.7 F | OXYGEN SATURATION: 96 % | HEART RATE: 78 BPM | DIASTOLIC BLOOD PRESSURE: 82 MMHG | WEIGHT: 221 LBS | SYSTOLIC BLOOD PRESSURE: 136 MMHG

## 2025-06-18 DIAGNOSIS — J06.9 ACUTE URI: Primary | ICD-10-CM

## 2025-06-18 PROCEDURE — 99213 OFFICE O/P EST LOW 20 MIN: CPT | Performed by: FAMILY MEDICINE

## 2025-06-18 PROCEDURE — G8754 DIAS BP LESS 90: HCPCS | Performed by: FAMILY MEDICINE

## 2025-06-18 PROCEDURE — G8752 SYS BP LESS 140: HCPCS | Performed by: FAMILY MEDICINE

## 2025-06-18 RX ORDER — PROMETHAZINE HYDROCHLORIDE AND DEXTROMETHORPHAN HYDROBROMIDE 6.25; 15 MG/5ML; MG/5ML
5 SYRUP ORAL EVERY 4 HOURS PRN
Qty: 118 ML | Refills: 1 | Status: SHIPPED | OUTPATIENT
Start: 2025-06-18 | End: 2025-06-28

## 2025-06-18 NOTE — PROGRESS NOTES
Subjective    Barbraa Landeros is a 69 y.o. female presenting today for: sick visit      HPI:   Five days of URI symptoms.  Sore throat, then cough, nasal congestion.  No fever.  Mild SOB with climbing stairs.   Cough is keeping her up at night.  Checked pulse ox at home and 96-97%.       Grandkids are constantly ill.     Health Maintenance Due   Topic Date Due    DEXA Scan  Never done    RSV Vaccine (1 - Risk 60-74 years 1-dose series) Never done     --    Patient Active Problem List   Diagnosis    Essential hypertension    Dyslipidemia    CAD (coronary artery disease)    Pericarditis    Degenerative joint disease of left hip    Paroxysmal A-fib (CMS/HCC)    Left knee DJD    Cervical spondylosis    Obesity, morbid (CMS/HCC)    Edema    Hip pain    Hyperlipemia    Pain    Right arm weakness    Acute URI          Current Outpatient Medications:     acetaminophen (TYLENOL EXTRA STRENGTH) 500 mg tablet, Take 500 mg by mouth daily., Disp: , Rfl:     albuterol HFA 90 mcg/actuation inhaler, Inhale 2 puffs 4 (four) times a day as needed for shortness of breath or wheezing., Disp: 8.5 g, Rfl: 0    aspirin 81 mg chewable tablet, Take 81 mg by mouth daily., Disp: , Rfl:     atorvastatin (LIPITOR) 40 mg tablet, Take 40 mg by mouth every evening.  , Disp: , Rfl:     dilTIAZem CD (CARDIZEM CD) 120 mg 24 hr capsule, Take 120 mg by mouth 2 (two) times a day., Disp: , Rfl:     flecainide (TAMBOCOR) 100 mg tablet, Take 100 mg by mouth 2 (two) times a day., Disp: , Rfl:     losartan (COZAAR) 50 mg tablet, Take 50 mg by mouth 2 (two) times a day., Disp: , Rfl:     metoprolol succinate XL (TOPROL-XL) 25 mg 24 hr tablet, Metoprolol Succinate ER 25 MG Oral Tablet Extended Release 24 Hour QTY: 30 tablet Days: 30 Refills: 5  Written: 04/19/24 Patient Instructions: as needed for significant palpitations, Disp: , Rfl:     promethazine-DM (PROMETHAZINE-DM) 6.25-15 mg/5 mL syrup, Take 5 mL by mouth every 4 (four) hours as needed for cough for up  "to 10 days., Disp: 118 mL, Rfl: 1    rivaroxaban (XARELTO) 20 mg tablet, Take 20 mg by mouth daily with dinner., Disp: , Rfl:     sertraline (ZOLOFT) 50 mg tablet, TAKE 1 TABLET BY MOUTH EVERY MORNING, Disp: 90 tablet, Rfl: 3    spironolactone (ALDACTONE) 50 mg tablet, Take 50 mg by mouth daily., Disp: , Rfl:     gabapentin (NEURONTIN) 300 mg capsule, Take 1 capsule (300 mg total) by mouth nightly., Disp: 30 capsule, Rfl: 0     Allergies   Allergen Reactions    Bystolic [Nebivolol]      Triggers asthma attack    Prevpac [Dhinxsec-Ohiahawrgkv-Lbrhpduvg] Other (see comments)     Flu symptoms    Amoxicillin     Clarithromycin     Estrogens, Conjugated, Synthetic A     Medroxyprogesterone            Review of Systems       Objective  Visit Vitals  /82 (BP Location: Left upper arm, Patient Position: Sitting)   Pulse 78   Temp 36.5 °C (97.7 °F) (Temporal)   Ht 1.676 m (5' 6\")   Wt 100 kg (221 lb)   SpO2 96%   BMI 35.67 kg/m²    Body mass index is 35.67 kg/m².    Wt Readings from Last 3 Encounters:   06/18/25 100 kg (221 lb)   05/08/25 98.4 kg (217 lb)   04/28/25 99.8 kg (220 lb)       BP Readings from Last 3 Encounters:   06/18/25 136/82   04/28/25 138/80   02/20/25 134/78            Physical Exam  Vitals reviewed.   Constitutional:       General: She is not in acute distress.     Appearance: Normal appearance. She is ill-appearing. She is not toxic-appearing.   HENT:      Head: Normocephalic and atraumatic.      Right Ear: External ear normal.      Left Ear: External ear normal.   Eyes:      Conjunctiva/sclera: Conjunctivae normal.      Pupils: Pupils are equal, round, and reactive to light.   Cardiovascular:      Rate and Rhythm: Normal rate and regular rhythm.      Heart sounds: No murmur heard.  Pulmonary:      Effort: Pulmonary effort is normal.      Breath sounds: Normal breath sounds. No wheezing, rhonchi or rales.      Comments: Cough  Musculoskeletal:      Cervical back: Neck supple.   Lymphadenopathy:      " Cervical: Cervical adenopathy present.   Skin:     General: Skin is warm and dry.   Neurological:      General: No focal deficit present.      Mental Status: She is alert and oriented to person, place, and time.   Psychiatric:         Mood and Affect: Mood normal.         Behavior: Behavior normal.             Assessment/Plan  Assessment & Plan  Acute URI  Patient education provided regarding viral URI. Supportive care. Appropriate symptomatic treatment with over the counter medication discussed with patient. Follow-up if symptoms persist >2w, new/worsening symptoms.    Sent cough medication.    Orders:    promethazine-DM (PROMETHAZINE-DM) 6.25-15 mg/5 mL syrup; Take 5 mL by mouth every 4 (four) hours as needed for cough for up to 10 days.             Return if symptoms worsen or fail to improve.     Kavitha Mancini MD

## 2025-06-18 NOTE — ASSESSMENT & PLAN NOTE
Patient education provided regarding viral URI. Supportive care. Appropriate symptomatic treatment with over the counter medication discussed with patient. Follow-up if symptoms persist >2w, new/worsening symptoms.    Sent cough medication.    Orders:    promethazine-DM (PROMETHAZINE-DM) 6.25-15 mg/5 mL syrup; Take 5 mL by mouth every 4 (four) hours as needed for cough for up to 10 days.

## 2025-06-20 DIAGNOSIS — R05.1 ACUTE COUGH: Primary | ICD-10-CM

## 2025-06-20 RX ORDER — AZITHROMYCIN 250 MG/1
TABLET, FILM COATED ORAL
Qty: 6 TABLET | Refills: 0 | Status: SHIPPED | OUTPATIENT
Start: 2025-06-20

## 2025-06-20 RX ORDER — FLUTICASONE PROPIONATE AND SALMETEROL 100; 50 UG/1; UG/1
1 POWDER RESPIRATORY (INHALATION)
Qty: 60 EACH | Refills: 0 | Status: SHIPPED | OUTPATIENT
Start: 2025-06-20 | End: 2025-07-20

## 2025-06-23 ENCOUNTER — HOSPITAL ENCOUNTER (OUTPATIENT)
Dept: RADIOLOGY | Age: 70
Discharge: HOME | End: 2025-06-23
Attending: FAMILY MEDICINE
Payer: MEDICARE

## 2025-06-23 DIAGNOSIS — R05.1 ACUTE COUGH: ICD-10-CM

## 2025-06-23 DIAGNOSIS — R05.1 ACUTE COUGH: Primary | ICD-10-CM

## 2025-06-23 PROCEDURE — 71046 X-RAY EXAM CHEST 2 VIEWS: CPT

## 2025-06-24 ENCOUNTER — RESULTS FOLLOW-UP (OUTPATIENT)
Dept: FAMILY MEDICINE | Facility: CLINIC | Age: 70
End: 2025-06-24

## 2025-06-26 ENCOUNTER — OFFICE VISIT (OUTPATIENT)
Dept: FAMILY MEDICINE | Facility: CLINIC | Age: 70
End: 2025-06-26
Payer: MEDICARE

## 2025-06-26 VITALS
HEIGHT: 66 IN | WEIGHT: 220 LBS | DIASTOLIC BLOOD PRESSURE: 64 MMHG | OXYGEN SATURATION: 95 % | BODY MASS INDEX: 35.36 KG/M2 | HEART RATE: 76 BPM | SYSTOLIC BLOOD PRESSURE: 116 MMHG | TEMPERATURE: 97.2 F

## 2025-06-26 DIAGNOSIS — H61.21 HEARING LOSS DUE TO CERUMEN IMPACTION, RIGHT: Primary | ICD-10-CM

## 2025-06-26 DIAGNOSIS — J45.30 MILD PERSISTENT ASTHMA WITHOUT COMPLICATION: ICD-10-CM

## 2025-06-26 PROCEDURE — G8752 SYS BP LESS 140: HCPCS | Performed by: FAMILY MEDICINE

## 2025-06-26 PROCEDURE — G8754 DIAS BP LESS 90: HCPCS | Performed by: FAMILY MEDICINE

## 2025-06-26 PROCEDURE — 99213 OFFICE O/P EST LOW 20 MIN: CPT | Mod: 25 | Performed by: FAMILY MEDICINE

## 2025-06-26 PROCEDURE — 69210 REMOVE IMPACTED EAR WAX UNI: CPT | Performed by: FAMILY MEDICINE

## 2025-06-26 RX ORDER — BUDESONIDE AND FORMOTEROL FUMARATE DIHYDRATE 160; 4.5 UG/1; UG/1
2 AEROSOL RESPIRATORY (INHALATION) 2 TIMES DAILY
Qty: 10.2 G | Refills: 5 | Status: SHIPPED | OUTPATIENT
Start: 2025-06-26 | End: 2025-12-23

## 2025-06-26 NOTE — ASSESSMENT & PLAN NOTE
Post viral cough/asthma exac improving.  Switch to symbicort as maintenance and prn.  Anticipate gradual improvement over the next few weeks.  She will let me know if this doesn't occur.   Orders:    budesonide-formoteroL (SYMBICORT) 160-4.5 mcg/actuation inhaler; Inhale 2 puffs 2 (two) times a day.

## 2025-06-26 NOTE — PROGRESS NOTES
"Subjective    Barbara Landeros is a 69 y.o. female presenting today for: Follow-up      HPI:   Here to f/u on cough from respiratory illness.  She feels like her cough is about 60% better.  Not sure if the azithromycin helped.  Does feel like advair has been helpful.   Coughing fits are still happening, but less severe.    Right ear hearing loss and \"crackling.\"  No fever.          Health Maintenance Due   Topic Date Due    DEXA Scan  Never done    RSV Vaccine (1 - Risk 60-74 years 1-dose series) Never done     --    Patient Active Problem List   Diagnosis    Essential hypertension    Dyslipidemia    CAD (coronary artery disease)    Pericarditis    Degenerative joint disease of left hip    Paroxysmal A-fib (CMS/HCC)    Left knee DJD    Cervical spondylosis    Obesity, morbid (CMS/HCC)    Edema    Hip pain    Hyperlipemia    Pain    Right arm weakness    Acute URI    Hearing loss due to cerumen impaction, right    Mild persistent asthma without complication          Current Outpatient Medications:     acetaminophen (TYLENOL EXTRA STRENGTH) 500 mg tablet, Take 500 mg by mouth daily., Disp: , Rfl:     albuterol HFA 90 mcg/actuation inhaler, Inhale 2 puffs 4 (four) times a day as needed for shortness of breath or wheezing., Disp: 8.5 g, Rfl: 0    aspirin 81 mg chewable tablet, Take 81 mg by mouth daily., Disp: , Rfl:     atorvastatin (LIPITOR) 40 mg tablet, Take 40 mg by mouth every evening.  , Disp: , Rfl:     budesonide-formoteroL (SYMBICORT) 160-4.5 mcg/actuation inhaler, Inhale 2 puffs 2 (two) times a day., Disp: 10.2 g, Rfl: 5    dilTIAZem CD (CARDIZEM CD) 120 mg 24 hr capsule, Take 120 mg by mouth 2 (two) times a day., Disp: , Rfl:     flecainide (TAMBOCOR) 100 mg tablet, Take 100 mg by mouth 2 (two) times a day., Disp: , Rfl:     losartan (COZAAR) 50 mg tablet, Take 50 mg by mouth 2 (two) times a day., Disp: , Rfl:     metoprolol succinate XL (TOPROL-XL) 25 mg 24 hr tablet, Metoprolol Succinate ER 25 MG Oral Tablet " "Extended Release 24 Hour QTY: 30 tablet Days: 30 Refills: 5  Written: 04/19/24 Patient Instructions: as needed for significant palpitations, Disp: , Rfl:     rivaroxaban (XARELTO) 20 mg tablet, Take 20 mg by mouth daily with dinner., Disp: , Rfl:     sertraline (ZOLOFT) 50 mg tablet, TAKE 1 TABLET BY MOUTH EVERY MORNING, Disp: 90 tablet, Rfl: 3    spironolactone (ALDACTONE) 50 mg tablet, Take 50 mg by mouth daily., Disp: , Rfl:     promethazine-DM (PROMETHAZINE-DM) 6.25-15 mg/5 mL syrup, Take 5 mL by mouth every 4 (four) hours as needed for cough for up to 10 days., Disp: 118 mL, Rfl: 1     Allergies   Allergen Reactions    Bystolic [Nebivolol]      Triggers asthma attack    Prevpac [Adnskswz-Bacjsdsnntm-Sgamemktx] Other (see comments)     Flu symptoms    Tolerates amoxicillin plain and azithromycin    Clarithromycin     Estrogens, Conjugated, Synthetic A     Medroxyprogesterone            Review of Systems       Objective  Visit Vitals  /64 (BP Location: Right upper arm, Patient Position: Sitting)   Pulse 76   Temp 36.2 °C (97.2 °F) (Temporal)   Ht 1.676 m (5' 6\")   Wt 99.8 kg (220 lb)   SpO2 95%   BMI 35.51 kg/m²    Body mass index is 35.51 kg/m².    Wt Readings from Last 3 Encounters:   06/26/25 99.8 kg (220 lb)   06/18/25 100 kg (221 lb)   05/08/25 98.4 kg (217 lb)       BP Readings from Last 3 Encounters:   06/26/25 116/64   06/18/25 136/82   04/28/25 138/80            Physical Exam  Vitals reviewed.   Constitutional:       Appearance: Normal appearance. She is not ill-appearing.   HENT:      Head: Normocephalic and atraumatic.      Right Ear: External ear normal. There is impacted cerumen.      Left Ear: External ear normal.   Eyes:      Conjunctiva/sclera: Conjunctivae normal.      Pupils: Pupils are equal, round, and reactive to light.   Cardiovascular:      Rate and Rhythm: Normal rate and regular rhythm.      Heart sounds: No murmur heard.  Pulmonary:      Effort: Pulmonary effort is normal.      " Breath sounds: Normal breath sounds. No wheezing, rhonchi or rales.   Skin:     General: Skin is warm and dry.   Neurological:      General: No focal deficit present.      Mental Status: She is alert and oriented to person, place, and time.   Psychiatric:         Mood and Affect: Mood normal.         Behavior: Behavior normal.        X-RAY CHEST 2 VIEWS [[id number]]  Status: Final result     PACS Images     Show images for X-RAY CHEST 2 VIEWS    Comments Released to [employer]     Add Comments   Seen     Study Result    Narrative & Impression   CLINICAL HISTORY:  R05.1: Acute cough     COMPARISON:   2/19/2025, CT 7/20/2011.     COMMENT: Frontal and lateral views of the chest are obtained. The heart size is  normal.  The hilar and mediastinal contours are normal. Pericardial  calcifications noted. The lungs are clear without consolidation, effusion or  pneumothorax. There is no acute osseous abnormality.     --  IMPRESSION:  No acute disease in the chest.          Assessment/Plan  Assessment & Plan  Hearing loss due to cerumen impaction, right  Procedure as below         Mild persistent asthma without complication  Post viral cough/asthma exac improving.  Switch to symbicort as maintenance and prn.  Anticipate gradual improvement over the next few weeks.  She will let me know if this doesn't occur.   Orders:    budesonide-formoteroL (SYMBICORT) 160-4.5 mcg/actuation inhaler; Inhale 2 puffs 2 (two) times a day.        Cerumen impaction removal: After discussing risks/benefits and gaining verbal consent, patient's Laterality: right ear(s) cleared by me using method: irrigation and curette with sufficient removal of cerumen. Symptoms improved after procedure. No complications.       Return if symptoms worsen or fail to improve, for Next scheduled follow-up.     Kavitha Mancini MD

## (undated) DEVICE — MIX-EVAC HIGH VACUUM KIT

## (undated) DEVICE — SUTURE MONOCRYL 2-0 Y266H

## (undated) DEVICE — WRAP COBAN LATEX FREE 4IN STERILE

## (undated) DEVICE — TUBE SUCTION 1/4INX20FT STERILE

## (undated) DEVICE — SUTURE STRATAFIX PGA 3-0 FS-1 CUTTING 30CM

## (undated) DEVICE — MANIFOLD FOUR PORT NEPTUNE

## (undated) DEVICE — APPLICATOR CHLORAPREP 26ML ORANGE TINT

## (undated) DEVICE — HOOD STERISHIELD

## (undated) DEVICE — PACK RFID TOTAL KNEE

## (undated) DEVICE — SURFACE ART NEXGEN LPS FLEX FIXED NSM EF 3-4 12MM: Type: IMPLANTABLE DEVICE | Site: KNEE | Status: NON-FUNCTIONAL

## (undated) DEVICE — DRAPE EXTREMITY UNIVERSAL

## (undated) DEVICE — ***USE 57023*** SUTURE ETHIBOND 5 MB46G

## (undated) DEVICE — PAD GROUND ELECTROSURGICAL W/CORD

## (undated) DEVICE — GLOVE PROTEXIS PI ORTHO 8.5

## (undated) DEVICE — SUCTION 18FR FRAZIER DISPOSABLE

## (undated) DEVICE — MANIFOLD SINGLE PORT NEPTUNE

## (undated) DEVICE — BANDAGE GAUZE LITE 4X4.1YD

## (undated) DEVICE — STOCKING ANTI-EM LG THIGH REG

## (undated) DEVICE — ***USE 120199***ELECTRODE HANDS FREE PACING/DEFIB/ECG

## (undated) DEVICE — GLOVE SURG PROTEXIS PF 8.5

## (undated) DEVICE — Device

## (undated) DEVICE — STIRRUP STRAP DISPOSABLE

## (undated) DEVICE — SUTURE QUILL 2 PDO RX-2066Q

## (undated) DEVICE — GLOVE SURG PROTEXIS PF 8

## (undated) DEVICE — SOLN IRRIG .9%SOD 1000ML

## (undated) DEVICE — SOLN IV 0.9% NSS 1000ML

## (undated) DEVICE — GOWN SURG X-LARGE MICROCOOL

## (undated) DEVICE — ADHESIVE SKIN DERMABOND ADVANCED 0.7ML

## (undated) DEVICE — DRAPE-U-1015

## (undated) DEVICE — TUBING SMOKE EVAC PENCIL COATED

## (undated) DEVICE — BLADE SAGITTAL DUAL CUT 4125-127-090

## (undated) DEVICE — BLADE RECIPROCATING 1MM

## (undated) DEVICE — BLADE RECIPRICATOR DUAL CUT

## (undated) DEVICE — BLADE SAGITTAL DUAL CUT 4118-127-90

## (undated) DEVICE — BATTERY SYSTEM 7

## (undated) DEVICE — BLADE RECIPROCATOR 277-096-325